# Patient Record
Sex: MALE | Race: WHITE | NOT HISPANIC OR LATINO | Employment: OTHER | URBAN - METROPOLITAN AREA
[De-identification: names, ages, dates, MRNs, and addresses within clinical notes are randomized per-mention and may not be internally consistent; named-entity substitution may affect disease eponyms.]

---

## 2018-01-29 DIAGNOSIS — Z01.810 PRE-OPERATIVE CARDIOVASCULAR EXAMINATION: ICD-10-CM

## 2018-01-29 DIAGNOSIS — Z01.812 PRE-OPERATIVE LABORATORY EXAMINATION: ICD-10-CM

## 2018-01-29 LAB
AMORPH CRY #/AREA URNS HPF: PRESENT /HPF
ANION GAP SERPL CALC-SCNC: 13 MMOL/L (ref 0–11.9)
APPEARANCE UR: ABNORMAL
BACTERIA #/AREA URNS HPF: ABNORMAL /HPF
BILIRUB UR QL STRIP.AUTO: NEGATIVE
BUN SERPL-MCNC: 12 MG/DL (ref 8–22)
CALCIUM SERPL-MCNC: 9.7 MG/DL (ref 8.5–10.5)
CHLORIDE SERPL-SCNC: 101 MMOL/L (ref 96–112)
CO2 SERPL-SCNC: 24 MMOL/L (ref 20–33)
COLOR UR: YELLOW
CREAT SERPL-MCNC: 0.98 MG/DL (ref 0.5–1.4)
CULTURE IF INDICATED INDCX: YES UA CULTURE
EKG IMPRESSION: NORMAL
EPI CELLS #/AREA URNS HPF: ABNORMAL /HPF
ERYTHROCYTE [DISTWIDTH] IN BLOOD BY AUTOMATED COUNT: 45.7 FL (ref 35.9–50)
GLUCOSE SERPL-MCNC: 94 MG/DL (ref 65–99)
GLUCOSE UR STRIP.AUTO-MCNC: NEGATIVE MG/DL
HCT VFR BLD AUTO: 48.5 % (ref 42–52)
HGB BLD-MCNC: 16.4 G/DL (ref 14–18)
KETONES UR STRIP.AUTO-MCNC: ABNORMAL MG/DL
LEUKOCYTE ESTERASE UR QL STRIP.AUTO: ABNORMAL
MCH RBC QN AUTO: 31.3 PG (ref 27–33)
MCHC RBC AUTO-ENTMCNC: 33.8 G/DL (ref 33.7–35.3)
MCV RBC AUTO: 92.6 FL (ref 81.4–97.8)
MICRO URNS: ABNORMAL
MUCOUS THREADS #/AREA URNS HPF: ABNORMAL /HPF
NITRITE UR QL STRIP.AUTO: POSITIVE
PH UR STRIP.AUTO: 6.5 [PH]
PLATELET # BLD AUTO: 317 K/UL (ref 164–446)
PMV BLD AUTO: 10.4 FL (ref 9–12.9)
POTASSIUM SERPL-SCNC: 3.4 MMOL/L (ref 3.6–5.5)
PROT UR QL STRIP: NEGATIVE MG/DL
RBC # BLD AUTO: 5.24 M/UL (ref 4.7–6.1)
RBC # URNS HPF: ABNORMAL /HPF
RBC UR QL AUTO: ABNORMAL
RENAL EPI CELLS #/AREA URNS HPF: ABNORMAL /HPF
SODIUM SERPL-SCNC: 138 MMOL/L (ref 135–145)
SP GR UR STRIP.AUTO: 1.01
UROBILINOGEN UR STRIP.AUTO-MCNC: 0.2 MG/DL
WBC # BLD AUTO: 8.8 K/UL (ref 4.8–10.8)
WBC #/AREA URNS HPF: ABNORMAL /HPF

## 2018-01-29 PROCEDURE — 36415 COLL VENOUS BLD VENIPUNCTURE: CPT

## 2018-01-29 PROCEDURE — 87186 SC STD MICRODIL/AGAR DIL: CPT

## 2018-01-29 PROCEDURE — 87077 CULTURE AEROBIC IDENTIFY: CPT

## 2018-01-29 PROCEDURE — 87641 MR-STAPH DNA AMP PROBE: CPT

## 2018-01-29 PROCEDURE — 85027 COMPLETE CBC AUTOMATED: CPT

## 2018-01-29 PROCEDURE — 81001 URINALYSIS AUTO W/SCOPE: CPT

## 2018-01-29 PROCEDURE — 80048 BASIC METABOLIC PNL TOTAL CA: CPT

## 2018-01-29 PROCEDURE — 93010 ELECTROCARDIOGRAM REPORT: CPT | Performed by: INTERNAL MEDICINE

## 2018-01-29 PROCEDURE — 87640 STAPH A DNA AMP PROBE: CPT

## 2018-01-29 PROCEDURE — 93005 ELECTROCARDIOGRAM TRACING: CPT

## 2018-01-29 PROCEDURE — 87086 URINE CULTURE/COLONY COUNT: CPT

## 2018-01-29 RX ORDER — POTASSIUM CHLORIDE 750 MG/1
10 TABLET, FILM COATED, EXTENDED RELEASE ORAL
COMMUNITY
End: 2020-04-16

## 2018-01-29 RX ORDER — HYDROCHLOROTHIAZIDE 12.5 MG/1
12.5 CAPSULE, GELATIN COATED ORAL DAILY
COMMUNITY

## 2018-01-29 RX ORDER — OXYCODONE AND ACETAMINOPHEN 10; 325 MG/1; MG/1
1-2 TABLET ORAL EVERY 4 HOURS PRN
COMMUNITY
End: 2020-04-16

## 2018-01-29 RX ORDER — LISINOPRIL 40 MG/1
40 TABLET ORAL DAILY
COMMUNITY

## 2018-01-29 RX ORDER — ZOLPIDEM TARTRATE 10 MG/1
10 TABLET ORAL NIGHTLY PRN
COMMUNITY
End: 2020-04-16

## 2018-01-29 NOTE — OR NURSING
"Pre-admit appointment completed. \"Preparing for your procedure\" sheet given to pt along with verbal and written instructions. Pt instructed to continue regularly prescribed medications through the day before surgery. Pt instructed to take the following medications the day of surgery with a sip of water, per anesthesia protocol; percocet if needed.    Pt has pain management with [facility]. States they are aware of surgical procedure.   "

## 2018-01-29 NOTE — DISCHARGE PLANNING
DISCHARGE PLANNING NOTE - TOTAL JOINT     Procedure: Procedure(s):  HIP ARTHROPLASTY TOTAL  Procedure Date: 2/12/2018  Insurance:  Payor: MEDICARE / Plan: MEDICARE PART A & B / Product Type: *No Product type* /   Equipment currently available at home? front-wheel walker and shower chair  Steps into the home? 1  Steps within the home? 0  Toilet height? Standard  Type of shower? walk-in shower  Who will be with you during your recovery? son  Is Outpatient Physical Therapy set up after surgery? No   Did you take the Total Joint Class and where? No     Plan: The patient declined the Guide to Joint replacement

## 2018-01-30 LAB
SCCMEC + MECA PNL NOSE NAA+PROBE: NEGATIVE
SCCMEC + MECA PNL NOSE NAA+PROBE: POSITIVE

## 2018-01-31 LAB
BACTERIA UR CULT: ABNORMAL
BACTERIA UR CULT: ABNORMAL
SIGNIFICANT IND 70042: ABNORMAL
SITE SITE: ABNORMAL
SOURCE SOURCE: ABNORMAL

## 2018-02-12 ENCOUNTER — APPOINTMENT (OUTPATIENT)
Dept: RADIOLOGY | Facility: MEDICAL CENTER | Age: 68
DRG: 470 | End: 2018-02-12
Attending: ORTHOPAEDIC SURGERY
Payer: MEDICARE

## 2018-02-12 ENCOUNTER — HOSPITAL ENCOUNTER (INPATIENT)
Facility: MEDICAL CENTER | Age: 68
LOS: 3 days | DRG: 470 | End: 2018-02-15
Attending: ORTHOPAEDIC SURGERY | Admitting: ORTHOPAEDIC SURGERY
Payer: MEDICARE

## 2018-02-12 DIAGNOSIS — M16.11 UNILATERAL PRIMARY OSTEOARTHRITIS, RIGHT HIP: Chronic | ICD-10-CM

## 2018-02-12 DIAGNOSIS — M17.12 PRIMARY OSTEOARTHRITIS OF LEFT KNEE: ICD-10-CM

## 2018-02-12 PROCEDURE — 501838 HCHG SUTURE GENERAL: Performed by: ORTHOPAEDIC SURGERY

## 2018-02-12 PROCEDURE — 700101 HCHG RX REV CODE 250

## 2018-02-12 PROCEDURE — 160048 HCHG OR STATISTICAL LEVEL 1-5: Performed by: ORTHOPAEDIC SURGERY

## 2018-02-12 PROCEDURE — 700112 HCHG RX REV CODE 229: Performed by: ORTHOPAEDIC SURGERY

## 2018-02-12 PROCEDURE — 0SR901A REPLACEMENT OF RIGHT HIP JOINT WITH METAL SYNTHETIC SUBSTITUTE, UNCEMENTED, OPEN APPROACH: ICD-10-PCS | Performed by: ORTHOPAEDIC SURGERY

## 2018-02-12 PROCEDURE — A9270 NON-COVERED ITEM OR SERVICE: HCPCS

## 2018-02-12 PROCEDURE — 160031 HCHG SURGERY MINUTES - 1ST 30 MINS LEVEL 5: Performed by: ORTHOPAEDIC SURGERY

## 2018-02-12 PROCEDURE — 700102 HCHG RX REV CODE 250 W/ 637 OVERRIDE(OP): Performed by: ORTHOPAEDIC SURGERY

## 2018-02-12 PROCEDURE — A9270 NON-COVERED ITEM OR SERVICE: HCPCS | Performed by: ORTHOPAEDIC SURGERY

## 2018-02-12 PROCEDURE — 700101 HCHG RX REV CODE 250: Performed by: ORTHOPAEDIC SURGERY

## 2018-02-12 PROCEDURE — 700111 HCHG RX REV CODE 636 W/ 250 OVERRIDE (IP)

## 2018-02-12 PROCEDURE — 700105 HCHG RX REV CODE 258: Performed by: ORTHOPAEDIC SURGERY

## 2018-02-12 PROCEDURE — 502578 HCHG PACK, TOTAL HIP: Performed by: ORTHOPAEDIC SURGERY

## 2018-02-12 PROCEDURE — 770001 HCHG ROOM/CARE - MED/SURG/GYN PRIV*

## 2018-02-12 PROCEDURE — 302135 SEQUENTIAL COMPRESSION MACHINE: Performed by: ORTHOPAEDIC SURGERY

## 2018-02-12 PROCEDURE — 160009 HCHG ANES TIME/MIN: Performed by: ORTHOPAEDIC SURGERY

## 2018-02-12 PROCEDURE — L3670 SO ACRO/CLAV CAN WEB PRE OTS: HCPCS | Performed by: ORTHOPAEDIC SURGERY

## 2018-02-12 PROCEDURE — A4450 NON-WATERPROOF TAPE: HCPCS | Performed by: ORTHOPAEDIC SURGERY

## 2018-02-12 PROCEDURE — 502000 HCHG MISC OR IMPLANTS RC 0278: Performed by: ORTHOPAEDIC SURGERY

## 2018-02-12 PROCEDURE — 700111 HCHG RX REV CODE 636 W/ 250 OVERRIDE (IP): Performed by: ORTHOPAEDIC SURGERY

## 2018-02-12 PROCEDURE — 160036 HCHG PACU - EA ADDL 30 MINS PHASE I: Performed by: ORTHOPAEDIC SURGERY

## 2018-02-12 PROCEDURE — 700102 HCHG RX REV CODE 250 W/ 637 OVERRIDE(OP)

## 2018-02-12 PROCEDURE — 160035 HCHG PACU - 1ST 60 MINS PHASE I: Performed by: ORTHOPAEDIC SURGERY

## 2018-02-12 PROCEDURE — A6223 GAUZE >16<=48 NO W/SAL W/O B: HCPCS | Performed by: ORTHOPAEDIC SURGERY

## 2018-02-12 PROCEDURE — 500864 HCHG NEEDLE, SPINAL 18G: Performed by: ORTHOPAEDIC SURGERY

## 2018-02-12 PROCEDURE — 160002 HCHG RECOVERY MINUTES (STAT): Performed by: ORTHOPAEDIC SURGERY

## 2018-02-12 PROCEDURE — 160042 HCHG SURGERY MINUTES - EA ADDL 1 MIN LEVEL 5: Performed by: ORTHOPAEDIC SURGERY

## 2018-02-12 PROCEDURE — 72170 X-RAY EXAM OF PELVIS: CPT

## 2018-02-12 DEVICE — IMPLANTABLE DEVICE: Type: IMPLANTABLE DEVICE | Site: HIP | Status: FUNCTIONAL

## 2018-02-12 RX ORDER — AMOXICILLIN 250 MG
1 CAPSULE ORAL NIGHTLY
Status: DISCONTINUED | OUTPATIENT
Start: 2018-02-12 | End: 2018-02-15 | Stop reason: HOSPADM

## 2018-02-12 RX ORDER — POTASSIUM CHLORIDE 20 MEQ/1
10 TABLET, EXTENDED RELEASE ORAL
Status: DISCONTINUED | OUTPATIENT
Start: 2018-02-13 | End: 2018-02-15 | Stop reason: HOSPADM

## 2018-02-12 RX ORDER — GABAPENTIN 300 MG/1
CAPSULE ORAL
Status: COMPLETED
Start: 2018-02-12 | End: 2018-02-12

## 2018-02-12 RX ORDER — ACETAMINOPHEN 500 MG
1000 TABLET ORAL EVERY 6 HOURS
Status: DISCONTINUED | OUTPATIENT
Start: 2018-02-12 | End: 2018-02-15 | Stop reason: HOSPADM

## 2018-02-12 RX ORDER — OXYCODONE HYDROCHLORIDE 5 MG/1
5 TABLET ORAL
Status: DISCONTINUED | OUTPATIENT
Start: 2018-02-12 | End: 2018-02-15 | Stop reason: HOSPADM

## 2018-02-12 RX ORDER — LISINOPRIL 20 MG/1
40 TABLET ORAL DAILY
Status: DISCONTINUED | OUTPATIENT
Start: 2018-02-12 | End: 2018-02-15 | Stop reason: HOSPADM

## 2018-02-12 RX ORDER — KETOROLAC TROMETHAMINE 30 MG/ML
INJECTION, SOLUTION INTRAMUSCULAR; INTRAVENOUS
Status: DISCONTINUED | OUTPATIENT
Start: 2018-02-12 | End: 2018-02-12 | Stop reason: HOSPADM

## 2018-02-12 RX ORDER — HYDROMORPHONE HYDROCHLORIDE 2 MG/ML
0.5 INJECTION, SOLUTION INTRAMUSCULAR; INTRAVENOUS; SUBCUTANEOUS
Status: DISCONTINUED | OUTPATIENT
Start: 2018-02-12 | End: 2018-02-15 | Stop reason: HOSPADM

## 2018-02-12 RX ORDER — ZOLPIDEM TARTRATE 5 MG/1
10 TABLET ORAL NIGHTLY PRN
Status: DISCONTINUED | OUTPATIENT
Start: 2018-02-12 | End: 2018-02-15 | Stop reason: HOSPADM

## 2018-02-12 RX ORDER — HALOPERIDOL 5 MG/ML
1 INJECTION INTRAMUSCULAR EVERY 6 HOURS PRN
Status: DISCONTINUED | OUTPATIENT
Start: 2018-02-12 | End: 2018-02-15 | Stop reason: HOSPADM

## 2018-02-12 RX ORDER — GABAPENTIN 300 MG/1
300 CAPSULE ORAL 3 TIMES DAILY
Status: DISCONTINUED | OUTPATIENT
Start: 2018-02-12 | End: 2018-02-15 | Stop reason: HOSPADM

## 2018-02-12 RX ORDER — POLYETHYLENE GLYCOL 3350 17 G/17G
1 POWDER, FOR SOLUTION ORAL 2 TIMES DAILY PRN
Status: DISCONTINUED | OUTPATIENT
Start: 2018-02-12 | End: 2018-02-15 | Stop reason: HOSPADM

## 2018-02-12 RX ORDER — DIPHENHYDRAMINE HYDROCHLORIDE 50 MG/ML
25 INJECTION INTRAMUSCULAR; INTRAVENOUS EVERY 6 HOURS PRN
Status: DISCONTINUED | OUTPATIENT
Start: 2018-02-12 | End: 2018-02-15 | Stop reason: HOSPADM

## 2018-02-12 RX ORDER — ROPIVACAINE HYDROCHLORIDE 5 MG/ML
INJECTION, SOLUTION EPIDURAL; INFILTRATION; PERINEURAL
Status: DISCONTINUED | OUTPATIENT
Start: 2018-02-12 | End: 2018-02-12 | Stop reason: HOSPADM

## 2018-02-12 RX ORDER — ONDANSETRON 2 MG/ML
4 INJECTION INTRAMUSCULAR; INTRAVENOUS EVERY 4 HOURS PRN
Status: DISCONTINUED | OUTPATIENT
Start: 2018-02-12 | End: 2018-02-15 | Stop reason: HOSPADM

## 2018-02-12 RX ORDER — SCOLOPAMINE TRANSDERMAL SYSTEM 1 MG/1
1 PATCH, EXTENDED RELEASE TRANSDERMAL
Status: DISCONTINUED | OUTPATIENT
Start: 2018-02-12 | End: 2018-02-15 | Stop reason: HOSPADM

## 2018-02-12 RX ORDER — BISACODYL 10 MG
10 SUPPOSITORY, RECTAL RECTAL
Status: DISCONTINUED | OUTPATIENT
Start: 2018-02-12 | End: 2018-02-15 | Stop reason: HOSPADM

## 2018-02-12 RX ORDER — ENEMA 19; 7 G/133ML; G/133ML
1 ENEMA RECTAL
Status: DISCONTINUED | OUTPATIENT
Start: 2018-02-12 | End: 2018-02-15 | Stop reason: HOSPADM

## 2018-02-12 RX ORDER — OXYCODONE HYDROCHLORIDE 10 MG/1
10 TABLET ORAL
Status: DISCONTINUED | OUTPATIENT
Start: 2018-02-12 | End: 2018-02-15 | Stop reason: HOSPADM

## 2018-02-12 RX ORDER — CELECOXIB 200 MG/1
200 CAPSULE ORAL 2 TIMES DAILY WITH MEALS
Status: DISCONTINUED | OUTPATIENT
Start: 2018-02-13 | End: 2018-02-15 | Stop reason: HOSPADM

## 2018-02-12 RX ORDER — EPINEPHRINE 1 MG/ML(1)
AMPUL (ML) INJECTION
Status: DISCONTINUED | OUTPATIENT
Start: 2018-02-12 | End: 2018-02-12 | Stop reason: HOSPADM

## 2018-02-12 RX ORDER — DOCUSATE SODIUM 100 MG/1
100 CAPSULE, LIQUID FILLED ORAL 2 TIMES DAILY
Status: DISCONTINUED | OUTPATIENT
Start: 2018-02-12 | End: 2018-02-15 | Stop reason: HOSPADM

## 2018-02-12 RX ORDER — HYDROCHLOROTHIAZIDE 12.5 MG/1
12.5 CAPSULE, GELATIN COATED ORAL DAILY
Status: DISCONTINUED | OUTPATIENT
Start: 2018-02-12 | End: 2018-02-15 | Stop reason: HOSPADM

## 2018-02-12 RX ORDER — AMOXICILLIN 250 MG
1 CAPSULE ORAL
Status: DISCONTINUED | OUTPATIENT
Start: 2018-02-12 | End: 2018-02-15 | Stop reason: HOSPADM

## 2018-02-12 RX ORDER — DEXTROSE, SODIUM CHLORIDE, SODIUM LACTATE, POTASSIUM CHLORIDE, AND CALCIUM CHLORIDE 5; .6; .31; .03; .02 G/100ML; G/100ML; G/100ML; G/100ML; G/100ML
INJECTION, SOLUTION INTRAVENOUS CONTINUOUS
Status: DISCONTINUED | OUTPATIENT
Start: 2018-02-12 | End: 2018-02-15 | Stop reason: HOSPADM

## 2018-02-12 RX ORDER — KETOROLAC TROMETHAMINE 30 MG/ML
15 INJECTION, SOLUTION INTRAMUSCULAR; INTRAVENOUS EVERY 6 HOURS
Status: COMPLETED | OUTPATIENT
Start: 2018-02-12 | End: 2018-02-13

## 2018-02-12 RX ORDER — OXYCODONE HCL 10 MG/1
TABLET, FILM COATED, EXTENDED RELEASE ORAL
Status: COMPLETED
Start: 2018-02-12 | End: 2018-02-12

## 2018-02-12 RX ORDER — CELECOXIB 200 MG/1
CAPSULE ORAL
Status: COMPLETED
Start: 2018-02-12 | End: 2018-02-12

## 2018-02-12 RX ORDER — SODIUM CHLORIDE, SODIUM LACTATE, POTASSIUM CHLORIDE, CALCIUM CHLORIDE 600; 310; 30; 20 MG/100ML; MG/100ML; MG/100ML; MG/100ML
1000 INJECTION, SOLUTION INTRAVENOUS
Status: DISCONTINUED | OUTPATIENT
Start: 2018-02-12 | End: 2018-02-15 | Stop reason: HOSPADM

## 2018-02-12 RX ORDER — DIPHENHYDRAMINE HCL 25 MG
25 TABLET ORAL EVERY 6 HOURS PRN
Status: DISCONTINUED | OUTPATIENT
Start: 2018-02-12 | End: 2018-02-15 | Stop reason: HOSPADM

## 2018-02-12 RX ORDER — DEXAMETHASONE SODIUM PHOSPHATE 4 MG/ML
4 INJECTION, SOLUTION INTRA-ARTICULAR; INTRALESIONAL; INTRAMUSCULAR; INTRAVENOUS; SOFT TISSUE
Status: DISCONTINUED | OUTPATIENT
Start: 2018-02-12 | End: 2018-02-15 | Stop reason: HOSPADM

## 2018-02-12 RX ADMIN — ACETAMINOPHEN 1000 MG: 500 TABLET ORAL at 23:05

## 2018-02-12 RX ADMIN — GABAPENTIN 300 MG: 300 CAPSULE ORAL at 16:18

## 2018-02-12 RX ADMIN — OXYCODONE HYDROCHLORIDE 10 MG: 10 TABLET ORAL at 16:19

## 2018-02-12 RX ADMIN — SODIUM CHLORIDE, SODIUM LACTATE, POTASSIUM CHLORIDE, CALCIUM CHLORIDE AND DEXTROSE MONOHYDRATE: 5; 600; 310; 30; 20 INJECTION, SOLUTION INTRAVENOUS at 23:23

## 2018-02-12 RX ADMIN — HYDROMORPHONE HYDROCHLORIDE 0.5 MG: 2 INJECTION INTRAMUSCULAR; INTRAVENOUS; SUBCUTANEOUS at 22:01

## 2018-02-12 RX ADMIN — SODIUM CHLORIDE, SODIUM LACTATE, POTASSIUM CHLORIDE, CALCIUM CHLORIDE 1000 ML: 600; 310; 30; 20 INJECTION, SOLUTION INTRAVENOUS at 11:00

## 2018-02-12 RX ADMIN — DOCUSATE SODIUM 100 MG: 100 CAPSULE, LIQUID FILLED ORAL at 20:08

## 2018-02-12 RX ADMIN — GABAPENTIN 300 MG: 300 CAPSULE ORAL at 20:07

## 2018-02-12 RX ADMIN — WATER 2 G: 100 INJECTION, SOLUTION INTRAVENOUS at 18:01

## 2018-02-12 RX ADMIN — OXYCODONE HYDROCHLORIDE 10 MG: 10 TABLET ORAL at 23:06

## 2018-02-12 RX ADMIN — SODIUM CHLORIDE, SODIUM LACTATE, POTASSIUM CHLORIDE, CALCIUM CHLORIDE AND DEXTROSE MONOHYDRATE 1000 ML: 5; 600; 310; 30; 20 INJECTION, SOLUTION INTRAVENOUS at 12:00

## 2018-02-12 RX ADMIN — OXYCODONE HYDROCHLORIDE 10 MG: 10 TABLET ORAL at 20:08

## 2018-02-12 RX ADMIN — DOCUSATE SODIUM AND SENNOSIDES 1 TABLET: 8.6; 5 TABLET, FILM COATED ORAL at 20:08

## 2018-02-12 RX ADMIN — ACETAMINOPHEN 1000 MG: 500 TABLET ORAL at 18:01

## 2018-02-12 RX ADMIN — GABAPENTIN 300 MG: 300 CAPSULE ORAL at 11:00

## 2018-02-12 RX ADMIN — ZOLPIDEM TARTRATE 10 MG: 5 TABLET ORAL at 23:13

## 2018-02-12 RX ADMIN — KETOROLAC TROMETHAMINE 15 MG: 30 INJECTION, SOLUTION INTRAMUSCULAR at 23:07

## 2018-02-12 RX ADMIN — LISINOPRIL 40 MG: 20 TABLET ORAL at 16:18

## 2018-02-12 RX ADMIN — KETOROLAC TROMETHAMINE 15 MG: 30 INJECTION, SOLUTION INTRAMUSCULAR at 18:01

## 2018-02-12 RX ADMIN — CELECOXIB 400 MG: 200 CAPSULE ORAL at 11:00

## 2018-02-12 RX ADMIN — OXYCODONE HYDROCHLORIDE 10 MG: 10 TABLET, FILM COATED, EXTENDED RELEASE ORAL at 11:00

## 2018-02-12 RX ADMIN — HYDROCHLOROTHIAZIDE 12.5 MG: 12.5 CAPSULE ORAL at 16:18

## 2018-02-12 ASSESSMENT — PATIENT HEALTH QUESTIONNAIRE - PHQ9
SUM OF ALL RESPONSES TO PHQ9 QUESTIONS 1 AND 2: 0
2. FEELING DOWN, DEPRESSED, IRRITABLE, OR HOPELESS: NOT AT ALL
1. LITTLE INTEREST OR PLEASURE IN DOING THINGS: NOT AT ALL
SUM OF ALL RESPONSES TO PHQ QUESTIONS 1-9: 0

## 2018-02-12 ASSESSMENT — PAIN SCALES - GENERAL
PAINLEVEL_OUTOF10: 2
PAINLEVEL_OUTOF10: 2
PAINLEVEL_OUTOF10: 10
PAINLEVEL_OUTOF10: 8
PAINLEVEL_OUTOF10: 8
PAINLEVEL_OUTOF10: 2
PAINLEVEL_OUTOF10: 8

## 2018-02-12 ASSESSMENT — LIFESTYLE VARIABLES
ALCOHOL_USE: NO
EVER_SMOKED: YES

## 2018-02-12 NOTE — OP REPORT
DATE OF SERVICE:  02/12/2018    PREOPERATIVE DIAGNOSIS:  Severe degenerative arthritis, right hip.    POSTOPERATIVE DIAGNOSIS:  Severe degenerative arthritis, right hip.    PROCEDURE:  Right total hip replacement using the Biomet system with an   ingrowth 64 G7 cup, a high wall liner, and ingrowth 18 Taperloc stem, and a   44+12 head.    SURGEON:  Kristopher Hanley MD    ASSISTANT:  Marbin Vera CST FA.  Of note, the assistant played a crucial role   in this procedure by helping with the exposure and instrumentation for this   complex arthroplasty.    ANESTHESIA:  General per endotracheal tube.    ANESTHESIOLOGIST:  Abner Castillo MD    OPERATIVE INDICATIONS:  The patient is a 67-year-old white male who has had a   long history of ongoing and worsening right hip pain.  He is no longer   responding to conservative program and this included activity modification,   nonsteroidal anti-inflammatories, corticosteroids, and exercise, and it was   felt that a hip replacement was indicated in this active gentleman.    PROCEDURE IN DETAIL:  The patient was brought to the operating room and placed   on table in supine position where a satisfactory general anesthetic agent was   administered per endotracheal tube.  The patient was given 2 grams of Ancef   and 10 mg/kg tranexamic acid IV prior to beginning the procedure.  The patient   was placed in right lateral decubitus position, secured on a PEG board.  The   left lower extremity was prepped with gel prep, draped free in a sterile   fashion.  An 18 cm posterolateral incision was made centered over the greater   trochanter.  This was carried sharply through skin and subcutaneous tissue   down to the fascia javi which was opened in line with the incision.  Charnley   retractor was placed.  The abductors were retracted.  The piriformis was   identified, tagged and divided.  The short external rotators were taken off as   a sleeve and a T-shaped capsulotomy was performed.  The  hip was dislocated.    A femoral neck osteotomy was made approximately 1.6 cm proximal to the lesser   trochanter.  Anterior capsule was released under direct visualization and an   anterior retractor was placed with a Hohmann inferiorly.  There were large   anterior and inferior osteophytes noted.  Spherical reamers were then used to   concentrically ream the acetabulum beginning with a size 54 and going up to a   size 63.  This gave excellent bleeding cortical cancellous bone.  A 64 trial   was placed with excellent fit.  There were noted to be 3 cysts within the   acetabulum, one actually quite large.  These were curetted out down to bone   with no further soft tissue remaining.  The wound was copiously irrigated with   normal saline using a waterpik and reamings from the spherical reamers were   packed into these defects and compressed with a 62 reamer on reverse.  The 64   G7 cup was placed approximately 45 degrees from the horizontal and 20 degrees   of anteversion and fully impacted with excellent stability.  This was secured   with 2 screws through the cup.  A high wall liner trial was placed with the   apex at the 10 o'clock position.  The proximal femur was delivered into the   wound.  The stump of the piriformis was resected.  The cookie cutter was used   followed by the canal finder, the tapered reamer and a lateralizing reamer.    The broaches for the Taperloc stems were then used beginning with a size 6 and   going up to a size 18.  The 18 gave excellent stability and fit.  Trial   reductions were performed with the 44+0, +6, +9, and +12 heads.  The +12 head   gave equal leg lengths with good stability.  There was some impingement   anteriorly over the osteophytes.  These osteophytes were then removed with an   osteotome and rongeur, which gave excellent stability throughout the range of   motion.  The trials were removed and the wound was copiously irrigated with   normal saline.  A joint solution was  created containing 40 mL of 0.5%   ropivacaine, 30 mg of Toradol, 1 mL of epinephrine diluted in 90 mL with   normal saline.  60 mL were placed in the periarticular tissues with the   remainder being placed in the subcutaneous tissue at the time of closure.  The   permanent high wall liner was then placed and fully impacted.  The proximal   femur was again delivered into the wound.  The proximal femur was irrigated   with normal saline.  The permanent 18 Taperloc stem was placed and fully   impacted again with excellent fit and stability.  Another trial reduction was   performed with the 44+12 head, which again gave equal leg lengths with   excellent stability and a full range of motion of the hip.  The 44+12 head was   then placed on to the cleaned Monzon taper and fully impacted.  The wound was   again copiously irrigated and a final reduction was performed.  The piriformis   was repaired with a #1 Ethibond.  The fascia javi was closed with #2 PDO   Quill suture, the subcutaneous tissue with 2-0 Monocryl, and the skin with   staples.  A sterile dressing of Adaptic flats and bandage tape was applied.    The patient was placed into a knee immobilizer, awakened, extubated in the   operating room, taken to recovery room in stable condition.  Sponge and needle   counts were correct.  There were no identified intraoperative complications.       ____________________________________     MD LC Chavarria / BRET    DD:  02/12/2018 13:49:42  DT:  02/12/2018 14:31:02    D#:  [id number]  Job#:  [id number]    cc: APRIL SON [facility] FA

## 2018-02-12 NOTE — OR SURGEON
----- Message from Cameron Yao CMA sent at 8/28/2017  4:21 PM EDT -----  Regarding: FW: Prescription Question  Contact: [phone]      ----- Message -----     From: Ed Spear     Sent: 8/28/2017  12:51 PM       To: Skip Bernal Teto Mr Clinical Pool  Subject: RE: Prescription Question                        I don't have any refills on the the Robaxin. I use [facility]  ----- Message -----  From: ASIM DRISCOLL  Sent: 8/25/2017 11:59 AM EDT  To: Ed Spear  Subject: RE: Prescription Question    Per Dr. Vermeesch, you can discontinue gabapentin and resume Robaxin 2-3 times a day as needed and be sure to take your Mobic with food.     ----- Message -----     From: Ed Spear     Sent: 8/25/2017 10:41 AM EDT       To: Marilyn K. Vermeesch, MD  Subject: Prescription Question    I was asking the doctor about the Gabapentin. I have had episodes of dizziness, faintness, and weakness since I've been taking this medication.  Do you want to take me off of this medicine.  This is what my daughter Farida Cano wants to know.  Thank you   Immediate Post OP Note    PreOp Diagnosis: Severe OA right hip    PostOp Diagnosis: same    Procedure(s):  HIP ARTHROPLASTY TOTAL - Wound Class: Clean    Surgeon(s):  Kristopher Hanley M.D.  Assist Chuy    Anesthesiologist/Type of Anesthesia:  Anesthesiologist: Abner Castillo M.D./General    Surgical Staff:  Assistant: RONY CopelandNMARLA  Circulator: Beatriz Amaro R.N.  Limb Rawls: Duane Ren  Scrub Person: Jason Guy    Specimens:  * No specimens in log *    Estimated Blood Loss: 150 cc    Findings: OA    Complications: none        2/12/2018 1:50 PM Kristopher Hanley

## 2018-02-13 LAB
ANION GAP SERPL CALC-SCNC: 7 MMOL/L (ref 0–11.9)
BUN SERPL-MCNC: 15 MG/DL (ref 8–22)
CALCIUM SERPL-MCNC: 8.6 MG/DL (ref 8.4–10.2)
CHLORIDE SERPL-SCNC: 101 MMOL/L (ref 96–112)
CO2 SERPL-SCNC: 27 MMOL/L (ref 20–33)
CREAT SERPL-MCNC: 0.88 MG/DL (ref 0.5–1.4)
ERYTHROCYTE [DISTWIDTH] IN BLOOD BY AUTOMATED COUNT: 51 FL (ref 35.9–50)
GLUCOSE SERPL-MCNC: 117 MG/DL (ref 65–99)
HCT VFR BLD AUTO: 37 % (ref 42–52)
HGB BLD-MCNC: 12.1 G/DL (ref 14–18)
MCH RBC QN AUTO: 31.1 PG (ref 27–33)
MCHC RBC AUTO-ENTMCNC: 32.7 G/DL (ref 33.7–35.3)
MCV RBC AUTO: 95.1 FL (ref 81.4–97.8)
PLATELET # BLD AUTO: 238 K/UL (ref 164–446)
PMV BLD AUTO: 9.7 FL (ref 9–12.9)
POTASSIUM SERPL-SCNC: 4.1 MMOL/L (ref 3.6–5.5)
RBC # BLD AUTO: 3.89 M/UL (ref 4.7–6.1)
SODIUM SERPL-SCNC: 135 MMOL/L (ref 135–145)
WBC # BLD AUTO: 8.2 K/UL (ref 4.8–10.8)

## 2018-02-13 PROCEDURE — 36415 COLL VENOUS BLD VENIPUNCTURE: CPT

## 2018-02-13 PROCEDURE — 97161 PT EVAL LOW COMPLEX 20 MIN: CPT

## 2018-02-13 PROCEDURE — A9270 NON-COVERED ITEM OR SERVICE: HCPCS | Performed by: ORTHOPAEDIC SURGERY

## 2018-02-13 PROCEDURE — 700101 HCHG RX REV CODE 250: Performed by: ORTHOPAEDIC SURGERY

## 2018-02-13 PROCEDURE — G8978 MOBILITY CURRENT STATUS: HCPCS | Mod: CK

## 2018-02-13 PROCEDURE — 97535 SELF CARE MNGMENT TRAINING: CPT

## 2018-02-13 PROCEDURE — 700111 HCHG RX REV CODE 636 W/ 250 OVERRIDE (IP): Performed by: ORTHOPAEDIC SURGERY

## 2018-02-13 PROCEDURE — 97530 THERAPEUTIC ACTIVITIES: CPT

## 2018-02-13 PROCEDURE — 770001 HCHG ROOM/CARE - MED/SURG/GYN PRIV*

## 2018-02-13 PROCEDURE — 700102 HCHG RX REV CODE 250 W/ 637 OVERRIDE(OP): Performed by: ORTHOPAEDIC SURGERY

## 2018-02-13 PROCEDURE — 700112 HCHG RX REV CODE 229: Performed by: ORTHOPAEDIC SURGERY

## 2018-02-13 PROCEDURE — G8979 MOBILITY GOAL STATUS: HCPCS | Mod: CJ

## 2018-02-13 PROCEDURE — 80048 BASIC METABOLIC PNL TOTAL CA: CPT

## 2018-02-13 PROCEDURE — 97116 GAIT TRAINING THERAPY: CPT

## 2018-02-13 PROCEDURE — 85027 COMPLETE CBC AUTOMATED: CPT

## 2018-02-13 RX ADMIN — ACETAMINOPHEN 1000 MG: 500 TABLET ORAL at 13:24

## 2018-02-13 RX ADMIN — GABAPENTIN 300 MG: 300 CAPSULE ORAL at 16:23

## 2018-02-13 RX ADMIN — HYDROCHLOROTHIAZIDE 12.5 MG: 12.5 CAPSULE ORAL at 10:19

## 2018-02-13 RX ADMIN — ACETAMINOPHEN 1000 MG: 500 TABLET ORAL at 20:34

## 2018-02-13 RX ADMIN — DOCUSATE SODIUM AND SENNOSIDES 1 TABLET: 8.6; 5 TABLET, FILM COATED ORAL at 20:34

## 2018-02-13 RX ADMIN — POTASSIUM CHLORIDE 10 MEQ: 1500 TABLET, EXTENDED RELEASE ORAL at 10:20

## 2018-02-13 RX ADMIN — ASPIRIN 325 MG: 325 TABLET, DELAYED RELEASE ORAL at 20:34

## 2018-02-13 RX ADMIN — WATER 2 G: 100 INJECTION, SOLUTION INTRAVENOUS at 02:18

## 2018-02-13 RX ADMIN — KETOROLAC TROMETHAMINE 15 MG: 30 INJECTION, SOLUTION INTRAMUSCULAR at 13:25

## 2018-02-13 RX ADMIN — DOCUSATE SODIUM 100 MG: 100 CAPSULE, LIQUID FILLED ORAL at 20:34

## 2018-02-13 RX ADMIN — ASPIRIN 325 MG: 325 TABLET, DELAYED RELEASE ORAL at 10:18

## 2018-02-13 RX ADMIN — GABAPENTIN 300 MG: 300 CAPSULE ORAL at 20:34

## 2018-02-13 RX ADMIN — OXYCODONE HYDROCHLORIDE 10 MG: 10 TABLET ORAL at 22:26

## 2018-02-13 RX ADMIN — ZOLPIDEM TARTRATE 10 MG: 5 TABLET ORAL at 23:20

## 2018-02-13 RX ADMIN — OXYCODONE HYDROCHLORIDE 10 MG: 10 TABLET ORAL at 16:23

## 2018-02-13 RX ADMIN — CELECOXIB 200 MG: 200 CAPSULE ORAL at 20:34

## 2018-02-13 RX ADMIN — KETOROLAC TROMETHAMINE 15 MG: 30 INJECTION, SOLUTION INTRAMUSCULAR at 06:23

## 2018-02-13 RX ADMIN — OXYCODONE HYDROCHLORIDE 10 MG: 10 TABLET ORAL at 13:24

## 2018-02-13 RX ADMIN — LISINOPRIL 40 MG: 20 TABLET ORAL at 10:20

## 2018-02-13 RX ADMIN — OXYCODONE HYDROCHLORIDE 10 MG: 10 TABLET ORAL at 10:20

## 2018-02-13 RX ADMIN — ACETAMINOPHEN 1000 MG: 500 TABLET ORAL at 06:22

## 2018-02-13 RX ADMIN — GABAPENTIN 300 MG: 300 CAPSULE ORAL at 10:19

## 2018-02-13 RX ADMIN — OXYCODONE HYDROCHLORIDE 10 MG: 10 TABLET ORAL at 02:11

## 2018-02-13 RX ADMIN — OXYCODONE HYDROCHLORIDE 10 MG: 10 TABLET ORAL at 06:23

## 2018-02-13 ASSESSMENT — PAIN SCALES - GENERAL
PAINLEVEL_OUTOF10: 7
PAINLEVEL_OUTOF10: 8
PAINLEVEL_OUTOF10: 7
PAINLEVEL_OUTOF10: 6
PAINLEVEL_OUTOF10: 7

## 2018-02-13 ASSESSMENT — GAIT ASSESSMENTS
ASSISTIVE DEVICE: FRONT WHEEL WALKER
DISTANCE (FEET): 100
ASSISTIVE DEVICE: FRONT WHEEL WALKER
GAIT LEVEL OF ASSIST: CONTACT GUARD ASSIST
DISTANCE (FEET): 125
DEVIATION: ANTALGIC;STEP TO
GAIT LEVEL OF ASSIST: CONTACT GUARD ASSIST
DEVIATION: ANTALGIC;STEP TO

## 2018-02-13 ASSESSMENT — ACTIVITIES OF DAILY LIVING (ADL): TOILETING: INDEPENDENT

## 2018-02-13 NOTE — THERAPY
Occupational Therapy- Eval initiated, will complete tomorrow when pt is scheduled for tentative d/c home.  Today extensive education provided re: hip precautions and adaptive equipment needed.  Pt will need reinforcement of this as he seemed to have limited recall of precautions despite taking pre op class and being seen by PT already 2x today.  Will follow.

## 2018-02-13 NOTE — CARE PLAN
Problem: Safety  Goal: Will remain free from injury  Outcome: PROGRESSING AS EXPECTED    Intervention: Provide assistance with mobility   02/13/18 0409   OTHER   Assistance / Tolerance Assistance of One;Tolerates Well         Problem: Venous Thromboembolism (VTW)/Deep Vein Thrombosis (DVT) Prevention:  Goal: Patient will participate in Venous Thrombosis (VTE)/Deep Vein Thrombosis (DVT)Prevention Measures  Outcome: PROGRESSING AS EXPECTED   02/12/18 2300   OTHER   Risk Assessment Score 4   VTE RISK High   Pharmacologic Prophylaxis Used (ASA)   Mechanical/VTE Prophylaxis   Mechanical Prophylaxis  SCDs, Sequential Compression Device   WILBER Hose (Graduated Compression Stockings) On   SCDs, Sequential Compression Device On

## 2018-02-13 NOTE — THERAPY
"Physical Therapy Evaluation completed.   Bed Mobility:  Supine to Sit: Contact Guard Assist  Transfers: Sit to Stand: Contact Guard Assist  Gait: Level Of Assist: Contact Guard Assist with Front-Wheel Walker x 100 feet      Plan of Care: Will benefit from Physical Therapy 7 times per week  Discharge Recommendations: Equipment: Raised Toilet Seat. Post-acute therapy Discharge to home with outpatient or home health for additional skilled therapy services.    See \"Rehab Therapy-Acute\" Patient Summary Report for complete documentation.     "

## 2018-02-14 PROBLEM — M16.11 UNILATERAL PRIMARY OSTEOARTHRITIS, RIGHT HIP: Chronic | Status: ACTIVE | Noted: 2018-02-14

## 2018-02-14 LAB — GLUCOSE BLD-MCNC: 110 MG/DL (ref 65–99)

## 2018-02-14 PROCEDURE — 770001 HCHG ROOM/CARE - MED/SURG/GYN PRIV*

## 2018-02-14 PROCEDURE — 700102 HCHG RX REV CODE 250 W/ 637 OVERRIDE(OP): Performed by: ORTHOPAEDIC SURGERY

## 2018-02-14 PROCEDURE — 97535 SELF CARE MNGMENT TRAINING: CPT

## 2018-02-14 PROCEDURE — 82962 GLUCOSE BLOOD TEST: CPT

## 2018-02-14 PROCEDURE — 700112 HCHG RX REV CODE 229: Performed by: ORTHOPAEDIC SURGERY

## 2018-02-14 PROCEDURE — G8988 SELF CARE GOAL STATUS: HCPCS | Mod: CJ

## 2018-02-14 PROCEDURE — A9270 NON-COVERED ITEM OR SERVICE: HCPCS | Performed by: ORTHOPAEDIC SURGERY

## 2018-02-14 PROCEDURE — 97165 OT EVAL LOW COMPLEX 30 MIN: CPT

## 2018-02-14 PROCEDURE — 97116 GAIT TRAINING THERAPY: CPT

## 2018-02-14 PROCEDURE — 97110 THERAPEUTIC EXERCISES: CPT

## 2018-02-14 PROCEDURE — 97530 THERAPEUTIC ACTIVITIES: CPT

## 2018-02-14 PROCEDURE — G8987 SELF CARE CURRENT STATUS: HCPCS | Mod: CK

## 2018-02-14 RX ORDER — ALPRAZOLAM 0.5 MG/1
0.5 TABLET ORAL ONCE
Status: COMPLETED | OUTPATIENT
Start: 2018-02-14 | End: 2018-02-14

## 2018-02-14 RX ORDER — GABAPENTIN 300 MG/1
300 CAPSULE ORAL 3 TIMES DAILY
Qty: 90 CAP | Refills: 0 | Status: SHIPPED | OUTPATIENT
Start: 2018-02-14

## 2018-02-14 RX ADMIN — OXYCODONE HYDROCHLORIDE 10 MG: 10 TABLET ORAL at 01:47

## 2018-02-14 RX ADMIN — CELECOXIB 200 MG: 200 CAPSULE ORAL at 09:59

## 2018-02-14 RX ADMIN — ACETAMINOPHEN 1000 MG: 500 TABLET ORAL at 13:43

## 2018-02-14 RX ADMIN — GABAPENTIN 300 MG: 300 CAPSULE ORAL at 09:57

## 2018-02-14 RX ADMIN — DOCUSATE SODIUM 100 MG: 100 CAPSULE, LIQUID FILLED ORAL at 09:00

## 2018-02-14 RX ADMIN — ZOLPIDEM TARTRATE 10 MG: 5 TABLET ORAL at 20:25

## 2018-02-14 RX ADMIN — GABAPENTIN 300 MG: 300 CAPSULE ORAL at 17:21

## 2018-02-14 RX ADMIN — ALPRAZOLAM 0.5 MG: 0.5 TABLET ORAL at 04:06

## 2018-02-14 RX ADMIN — OXYCODONE HYDROCHLORIDE 10 MG: 10 TABLET ORAL at 20:25

## 2018-02-14 RX ADMIN — ASPIRIN 325 MG: 325 TABLET, DELAYED RELEASE ORAL at 09:58

## 2018-02-14 RX ADMIN — CELECOXIB 200 MG: 200 CAPSULE ORAL at 17:22

## 2018-02-14 RX ADMIN — ACETAMINOPHEN 1000 MG: 500 TABLET ORAL at 07:13

## 2018-02-14 RX ADMIN — OXYCODONE HYDROCHLORIDE 10 MG: 10 TABLET ORAL at 13:43

## 2018-02-14 RX ADMIN — GABAPENTIN 300 MG: 300 CAPSULE ORAL at 20:24

## 2018-02-14 RX ADMIN — LISINOPRIL 40 MG: 20 TABLET ORAL at 09:57

## 2018-02-14 RX ADMIN — ASPIRIN 325 MG: 325 TABLET, DELAYED RELEASE ORAL at 20:24

## 2018-02-14 RX ADMIN — ACETAMINOPHEN 1000 MG: 500 TABLET ORAL at 23:25

## 2018-02-14 RX ADMIN — OXYCODONE HYDROCHLORIDE 10 MG: 10 TABLET ORAL at 07:13

## 2018-02-14 RX ADMIN — OXYCODONE HYDROCHLORIDE 10 MG: 10 TABLET ORAL at 17:21

## 2018-02-14 RX ADMIN — HYDROCHLOROTHIAZIDE 12.5 MG: 12.5 CAPSULE ORAL at 09:58

## 2018-02-14 RX ADMIN — ACETAMINOPHEN 1000 MG: 500 TABLET ORAL at 01:47

## 2018-02-14 RX ADMIN — OXYCODONE HYDROCHLORIDE 10 MG: 10 TABLET ORAL at 23:25

## 2018-02-14 ASSESSMENT — PAIN SCALES - GENERAL
PAINLEVEL_OUTOF10: 6
PAINLEVEL_OUTOF10: 7
PAINLEVEL_OUTOF10: 7
PAINLEVEL_OUTOF10: 6
PAINLEVEL_OUTOF10: 3
PAINLEVEL_OUTOF10: 4
PAINLEVEL_OUTOF10: 6

## 2018-02-14 ASSESSMENT — GAIT ASSESSMENTS
DEVIATION: ANTALGIC;DECREASED HEEL STRIKE;DECREASED TOE OFF
GAIT LEVEL OF ASSIST: CONTACT GUARD ASSIST
ASSISTIVE DEVICE: FRONT WHEEL WALKER
DISTANCE (FEET): 100

## 2018-02-14 ASSESSMENT — ACTIVITIES OF DAILY LIVING (ADL): TOILETING: INDEPENDENT

## 2018-02-14 NOTE — DISCHARGE INSTRUCTIONS
Wear immobilizer at night when sleeping. May shower with incision covered. Aspirin 325 mg twice daily.    Discharge Instructions    Discharged to home by car with relative. Discharged via wheelchair, hospital escort: Yes.  Special equipment needed: Walker    Be sure to schedule a follow-up appointment with your primary care doctor or any specialists as instructed.     Discharge Plan:   Influenza Vaccine Indication: Not indicated: Previously immunized this influenza season and > 8 years of age    I understand that a diet low in cholesterol, fat, and sodium is recommended for good health. Unless I have been given specific instructions below for another diet, I accept this instruction as my diet prescription.   Other diet: n/a    Special Instructions: Discharge instructions for the Orthopedic Patient    Follow up with Primary Care Physician within 2 weeks of discharge to home, regarding:  Review of medications and diagnostic testing.  Surveillance for medical complications.  Workup and treatment of osteoporosis, if appropriate.     -Is this a Joint Replacement patient? Yes   Total Joint Hip Replacement Discharge Instructions    Pain  - The goal is to slowly wean off the prescription pain medicine.  - Ice can be used for pain control.  20 minutes at a time is recommended, and never directly against your skin or incision.  - Most patients are off the pain pills by 3 weeks; others may require a low level of pain medications for many months. If your pain continues to be severe, follow up with your physician.  Infection  Deep hip joint infections that require removal of the prostheses occur in less than 0.1% of patients. Lesser infections in the skin (cellulites) are more common and much more easily treated.  - Keep the incision as clean and dry as possible.  - Always wash your hands before touching your incision.  - Skin infections tend to develop around 7-10 days after surgery, most can be treated with oral  antibiotics.  - Dental Care should be delayed for 3 months after surgery, your surgeon recommends taking a dose of antibiotics 1 hour prior to any dental procedure.  After 2 years, most surgeons recommend antibiotics only before an extensive procedure.  Ask your surgeon what he recommends.  - Signs and symptoms of infection can include:  low grade fever, redness, pain, swelling and drainage from your incision.  Notify your surgeon immediately if you develop any of these symptoms.  Post op Disturbances  - Bowel habits - constipation is extremely common and is caused by a combination of anesthesia, lack of mobility and pain medicine.  Use stool softeners or laxatives if necessary. It is important not to ignore this problem, as bowel obstructions can be a serious complication after joint replacement surgery.  - Mood/Energy Level - Many patients experience a lack of energy and endurance for up to 2-3 months after surgery.  Some may also feel down and can even become depressed.  This is likely due to the postoperative anemia, change in activity level, lack of sleep, pain medicine and just the emotional reaction to the surgery itself that is a big disruption in a person’s life.  This usually passes.  If symptoms persist, follow up with your primary physician.  - Returning to work - Your surgeon will give you more specific instructions.  Generally, if you work a sedentary job requiring little standing or walking, most patients may return within 2-6 weeks.  Manual labor jobs involving walking, lifting and standing may take 3-4 months.  Your surgeon’s office can provide a release to part-time or light duty work early on in your recovery and progress you to full duty as able.  - Driving - You can begin driving an automatic shift car in 4 to 8 weeks, provided you are no longer taking narcotic pain medication. If you have a stick-shift car and your right hip was replaced, do not begin driving until your doctor says you can.    - Avoiding falls -  throw rugs and tack down loose carpeting.  Be aware of floor hazards such as pets, small objects or uneven surfaces.   -  Airport Metal Detectors - The sensitivity of metal detectors varies and it is likely that your prosthesis will cause an alarm. Inform the  that you have an artificial joint.  Diet  - Resume your normal diet as tolerated.  - It is important to achieve a healthy nutritional status by eating a well balanced diet on a regular basis.  - Your physician may recommend that you take iron and vitamin supplements.   - Continue to drink plenty of fluids.  Shower/Bathing  - You may shower as soon as you get home from the hospital unless otherwise instructed.  - Keep your incision out of water.  To keep the incision dry when showering, cover it with a plastic bag or plastic wrap.  - Pat incision dry if it gets wet.  Don’t rub.  - Do not submerge in a bath until staples are out and the incision is completely healed. (Approximately 6-8 weeks after surgery).  Dressing Change:  Procedure (if recommended by your physician)  - Wash hands.  - Open all dressing change materials.  - Remove old dressing and discard.  - Inspect incision for redness, increase in clear drainage, yellow/green drainage, odor and surrounding skin hot to touch.  -  ABD (large gauze) pad by one corner and lay over the incision.  Be careful not to touch the inside of the dressing that will lay over the incision.  - Secure in place as instructed (Ace wrap or tape).    Swelling/Bruising  - Swelling is normal after hip replacement and can involve the thigh, knee, calf and foot.  - Swelling can last from 3-6 months.  - Elevate your leg higher than your heart while reclining.  The first week you are home you should elevate your leg an equal amount of time, as you are active.    - Anti-inflammatory pills can be taken once you have stopped the blood thinners.  - The swelling is usually worse after you  go home since you are upright for longer periods of time.  - Bruising is common and can involve the entire leg including the thigh, calf and even foot.  Bruising often does not appear until after you arrive home and it can be quite dramatic- purple, black, green.  The bruising you can see is not usually concerning and will subside without any treatment.      Blood Clot Prevention  Blood clots in the legs and the less common, but frightening, clots that travel to the lungs are a real focus of our preventative. Most patients are at standard risk for them, but those patients who are at higher risk include people who have had previous clots, a family history of clotting, smoking, diabetes, obesity, advanced age, use of estrogen and a sedentary lifestyle.    - Signs of blood clots in legs - Swelling in thigh, calf or ankle that does not go down with elevation.  Pain, heat and tenderness in calf, back of calf or groin area.  NOTE: blood clots can occur in either leg.  - You have been receiving anticoagulant therapy (blood thinners) in the hospital and you may be instructed to continue at home depending on your risk factors.  - Your risk for developing a clot continues for up to 2-3 months after surgery.  You should avoid prolonged sitting and dehydration during that time (long air trips and car trips).  If you do take a trip during this time, please get up and move around every 1- 1.5 hours.  - If you are prescribed blood thinning medication for home, follow instructions as directed. (Handouts provided if applicable).      Activity    Once you get home, you should stay active. The key is not to overdo it! While you can expect some good days and some bad days, you should notice a gradual improvement over time you should notice a gradual improvement and a gradual increase in your endurance over the next 6 to 12 months.    - Weight Bearing - If you have undergone cemented or hybrid hip replacement, you can put some weight on  the leg immediately using a cane or walker, and you should continue to use some support for 4 to 6 weeks to help the muscles recover.   - Sleeping Positions - Sleep on your back with your legs slightly apart or on your side with a regular pillow between your knees. Be sure to use the pillow for at least 6 weeks, or until your doctor says you can do without it. Sleeping on your stomach should be all right  - Sitting - For at least the first 3 months, sit only in chairs that have arms. Do not sit on low chairs, low stools, or reclining chairs. Do not cross your legs at the knees. The physical therapist will show you how to sit and stand from a chair, keeping your affected leg out in front of you. Get up and move around on a regular basis--at least once every hour.  - Walking - Walk as much as you like once your doctor gives you the go-ahead, but remember that walking is no substitute for your prescribed exercises. Walking with a pair of trekking poles is helpful and adds as much as 40% to the exercise you get when you walk  - Therapy may be needed in some cases, to strengthen your muscles and improve your gait (walking pattern).  This decision will be made at your post-operative appointment.  Follow your therapist recommended post-operative exercises (handout provided by Therapist).  - Swimming is also recommended; you can begin as soon as the sutures have been removed and the wound is healed, approximately 6 to 8 weeks after surgery. Using a pair of training fins may make swimming a more enjoyable and effective exercise.  - Other activities - Lower impact activities are preferred.  If you have specific questions, consult your Surgeon.    - Sexual activity - Your surgeon can tell you when it should be safe to resume sexual activity.      When to Call the Doctor   Call the physician if:   - Fever over 100.5? F  - Increased pain, drainage, redness, odor or heat around the incision area  - Shaking chills  - Increased  knee pain with activity and rest  - Increased pain in calf, tenderness or redness above or below the knee  - Increased swelling of calf, ankle, foot  - Sudden increased shortness of breath, sudden onset of chest pain, localized chest pain with coughing  - Incision opening  Or, if there are any questions or concerns about medications or care.       -Is this patient being discharged with medication to prevent blood clots?  Yes, Aspirin Aspirin, ASA oral tablets  What is this medicine?  ASPIRIN (AS pir in) is a pain reliever. It is used to treat mild pain and fever. This medicine is also used as directed by a doctor to prevent and to treat heart attacks, to prevent strokes, and to treat arthritis or inflammation.  This medicine may be used for other purposes; ask your health care provider or pharmacist if you have questions.  COMMON BRAND NAME(S): Aspir-Low, Aspir-Yasmin , Aspirtab , Omnic[employer]y Advanced Aspirin, Omnic[employer]y Aspirin Extra Strength, Omnicademy Aspirin Plus, Omnic[employer]y Aspirin, Omnicademy Genuine Aspirin, Omnicademy Womens Aspirin , Bufferin Extra Strength, Bufferin Low Dose, Bufferin  What should I tell my health care provider before I take this medicine?  They need to know if you have any of these conditions:  -anemia  -asthma  -bleeding problems  -child with chickenpox, the flu, or other viral infection  -diabetes  -gout  -if you frequently drink alcohol containing drinks  -kidney disease  -liver disease  -low level of vitamin K  -lupus  -smoke tobacco  -stomach ulcers or other problems  -an unusual or allergic reaction to aspirin, tartrazine dye, other medicines, dyes, or preservatives  -pregnant or trying to get pregnant  -breast-feeding  How should I use this medicine?  Take this medicine by mouth with a glass of water. Follow the directions on the package or prescription label. You can take this medicine with or without food. If it upsets your stomach, take it with food. Do not take your medicine more often than directed.  Talk to  your pediatrician regarding the use of this medicine in children. While this drug may be prescribed for children as young as 12 years of age for selected conditions, precautions do apply. Children and teenagers should not use this medicine to treat chicken pox or flu symptoms unless directed by a doctor.  Patients over 65 years old may have a stronger reaction and need a smaller dose.  Overdosage: If you think you have taken too much of this medicine contact a poison control center or emergency room at once.  NOTE: This medicine is only for you. Do not share this medicine with others.  What if I miss a dose?  If you are taking this medicine on a regular schedule and miss a dose, take it as soon as you can. If it is almost time for your next dose, take only that dose. Do not take double or extra doses.  What may interact with this medicine?  Do not take this medicine with any of the following medications:  -cidofovir  -ketorolac  -probenecid  This medicine may also interact with the following medications:  -alcohol  -alendronate  -bismuth subsalicylate  -flavocoxid  -herbal supplements like feverfew, garlic, sara, ginkgo biloba, horse chestnut  -medicines for diabetes or glaucoma like acetazolamide, methazolamide  -medicines for gout  -medicines that treat or prevent blood clots like enoxaparin, heparin, ticlopidine, warfarin  -other aspirin and aspirin-like medicines  -NSAIDs, medicines for pain and inflammation, like ibuprofen or naproxen  -pemetrexed  -sulfinpyrazone  -varicella live vaccine  This list may not describe all possible interactions. Give your health care provider a list of all the medicines, herbs, non-prescription drugs, or dietary supplements you use. Also tell them if you smoke, drink alcohol, or use illegal drugs. Some items may interact with your medicine.  What should I watch for while using this medicine?  If you are treating yourself for pain, tell your doctor or health care professional if  the pain lasts more than 10 days, if it gets worse, or if there is a new or different kind of pain. Tell your doctor if you see redness or swelling. Also, check with your doctor if you have a fever that lasts for more than 3 days. Only take this medicine to prevent heart attacks or blood clotting if prescribed by your doctor or health care professional.  Do not take aspirin or aspirin-like medicines with this medicine. Too much aspirin can be dangerous. Always read the labels carefully.  This medicine can irritate your stomach or cause bleeding problems. Do not smoke cigarettes or drink alcohol while taking this medicine. Do not lie down for 30 minutes after taking this medicine to prevent irritation to your throat.  If you are scheduled for any medical or dental procedure, tell your healthcare provider that you are taking this medicine. You may need to stop taking this medicine before the procedure.  What side effects may I notice from receiving this medicine?  Side effects that you should report to your doctor or health care professional as soon as possible:  -allergic reactions like skin rash, itching or hives, swelling of the face, lips, or tongue  -black, tarry stools  -bloody, coffee ground-like vomit  -breathing problems  -changes in hearing, ringing in the ears  -confusion  -general ill feeling or flu-like symptoms  -pain on swallowing  -redness, blistering, peeling or loosening of the skin, including inside the mouth or nose  -trouble passing urine or change in the amount of urine  -unusual bleeding or bruising  -unusually weak or tired  -yellowing of the eyes or skin  Side effects that usually do not require medical attention (report to your doctor or health care professional if they continue or are bothersome):  -diarrhea or constipation  -nausea, vomiting  -stomach gas, heartburn  This list may not describe all possible side effects. Call your doctor for medical advice about side effects. You may report  side effects to FDA at 7-956-FDA-8894.  Where should I keep my medicine?  Keep out of the reach of children.  Store at room temperature between 15 and 30 degrees C (59 and 86 degrees F). Protect from heat and moisture. Do not use this medicine if it has a strong vinegar smell. Throw away any unused medicine after the expiration date.  NOTE: This sheet is a summary. It may not cover all possible information. If you have questions about this medicine, talk to your doctor, pharmacist, or health care provider.  © 2014, [employer]/Gold Standard. (3/10/2009 10:44:17 AM)      · Is patient discharged on Warfarin / Coumadin?   No     Depression / Suicide Risk    As you are discharged from this [facility] facility, it is important to learn how to keep safe from harming yourself.    Recognize the warning signs:  · Abrupt changes in personality, positive or negative- including increase in energy   · Giving away possessions  · Change in eating patterns- significant weight changes-  positive or negative  · Change in sleeping patterns- unable to sleep or sleeping all the time   · Unwillingness or inability to communicate  · Depression  · Unusual sadness, discouragement and loneliness  · Talk of wanting to die  · Neglect of personal appearance   · Rebelliousness- reckless behavior  · Withdrawal from people/activities they love  · Confusion- inability to concentrate     If you or a loved one observes any of these behaviors or has concerns about self-harm, here's what you can do:  · Talk about it- your feelings and reasons for harming yourself  · Remove any means that you might use to hurt yourself (examples: pills, rope, extension cords, firearm)  · Get professional help from the community (Mental Health, Substance Abuse, psychological counseling)  · Do not be alone:Call your Safe Contact- someone whom you trust who will be there for you.  · Call your local CRISIS HOTLINE 792-7625 or 791-383-7706  · Call your local Children's  Mobile Crisis Response Team [place] (246) 976-1867 or www.[employer]  · Call the toll free National Suicide Prevention Hotlines   · National Suicide Prevention Lifeline 923-382-LYVL (6587)  · National Hope Line Network 800-SUICIDE (090-6050)

## 2018-02-14 NOTE — THERAPY
"Physical Therapy Treatment completed.   Bed Mobility:  Supine to Sit: Moderate Assist  Transfers: Sit to Stand: Contact Guard Assist  Gait: Level Of Assist: Contact Guard Assist with Front-Wheel Walker       Plan of Care: Will benefit from Physical Therapy 7 times per week  Discharge Recommendations: Equipment: Raised Toilet Seat. Post-acute therapy Discharge to a transitional care facility for continued skilled therapy services with focus on strengthening and gait/ balance training to ensure DC home is safe (pt lives alone in [facility] room and has a son who can stay with him for a short while).    See \"Rehab Therapy-Acute\" Patient Summary Report for complete documentation.       "

## 2018-02-14 NOTE — DISCHARGE SUMMARY
DATE OF ADMISSION:  02/12/2018    DATE OF DISCHARGE:  02/14/2018    ADMITTING DIAGNOSIS:  Severe degenerative arthritis, right hip.    DISCHARGE DIAGNOSIS:  Severe degenerative arthritis, right hip.    PROCEDURE:  Right total hip replacement on 02/12/2018.    INFECTIONS:  None.    COMPLICATIONS:  None.    CONDITION ON DISCHARGE:  Stable.    ADMITTING INFORMATION:  Please see dictated note.    HOSPITAL COURSE:  After appropriate preoperative evaluation, obtaining   informed consent, patient was taken to the operating on 02/12/2018 where the   above-listed procedure was performed under general anesthetic agent.  The   patient tolerated the procedure well and was taken to recovery room and then   to the floor in stable condition.  He received 24 hours of perioperative Ancef   and was placed on aspirin as well as use of mechanical prophylaxis for DVT   prevention.  He was begun on ambulation on the afternoon of surgery and had   moderate discomfort on postop day #1 and was felt to be a little unsteady by   postop day #2, was comfortable on p.o. pain medication and independently   ambulatory with his walker.  He has stable labs, clean and dry wound, intact   neurologic and vascular exam and soft and nontender calf.  He will be   discharged home after p.m. therapy on postop day #2.    DISPOSITION:  The patient will be discharged home today and will return to see   me in 2 weeks for a wound check and staple removal.  He may be weightbearing   as tolerated on the right leg.  He has been repeatedly instructed in total hip   precautions and will wear his knee immobilizer at night when sleeping.    Arrangements have been made for outpatient physical therapy to begin this   week.  He does have a walker at home and an order has been placed for a 3-in-1   commode to function as an elevated toilet seat.  He may shower with incision   covered, but the wound should be kept clean and dry and we should be notified   if there are  problems with increasing pain, drainage, evidence of infection,   neurovascular compromise or other significant concern.  He will resume his   normal preoperative home medications and has his pain medicine at home from   Dr. Bloom.  He was given some gabapentin by his primary care doctor as a   trial and this helped so.  I have also written him a prescription for   gabapentin 300 mg, #90 to take 1 p.o. t.i.d. for 1 month.  He will take an   aspirin 325 mg p.o. b.i.d. for DVT prophylaxis.       ____________________________________     MD LC Chavarria / BRET    DD:  02/14/2018 08:49:14  DT:  02/14/2018 11:48:01    D#:  [id number]  Job#:  [id number]

## 2018-02-14 NOTE — THERAPY
"Occupational Therapy Evaluation completed.   Functional Status:  Chacorta supine to sit EOB.  Chacorta LB dressing using reacher, sockaide and LH shoehorn.  Pt needs constant cues to remember not to bend forward past 90 degrees.  Pt was educated yesterday on equipment and precautions but He seems to have poor carryover of his hip precautions.  CGA sit to stand with height of bed slightly elevated.  Close CGA with FWW to walk into bathroom and CGA to stand to urinate.  Back into bed with Chacorta with LE's.  Pt educated on where to get hip kit, OT suggested [facility] or [employer] or [employer], however pt proceeded to read the entire equipment handout out loud to therapist, getting self quite distracted.  Needs frequent redirection and cues for safety.  At this time, pt is NOT safe to d/c home and would benefit from further inpt post acute therapy prior to home.  Will follow.  Plan of Care: Will benefit from Occupational Therapy 3 times per week  Discharge Recommendations:  Equipment: hip kit, shower chair, raised toilet seat vs 3 in 1 commode. Post-acute therapy Discharge to a transitional care facility for continued skilled therapy services.    See \"Rehab Therapy-Acute\" Patient Summary Report for complete documentation.    "

## 2018-02-14 NOTE — CARE PLAN
Problem: Safety  Goal: Will remain free from injury  Outcome: PROGRESSING AS EXPECTED  Bed locked and low, controls on call light button within reach.   Pt uses call light button appropriately and calls for needs/help, frequently    Problem: Infection  Goal: Will remain free from infection  Outcome: PROGRESSING AS EXPECTED  Remains afebrile, has been sweating a lot - MD made aware.   Will continue to monitor.    Problem: Bowel/Gastric:  Goal: Normal bowel function is maintained or improved  Abdomen is big and rounded, semi-firm.  Passing gas, bowel sounds present x4 quads.  Increased appetite.    Problem: Pain Management  Goal: Pain level will decrease to patient's comfort goal  Outcome: PROGRESSING AS EXPECTED   02/13/18 1426 02/14/18 0200   OTHER   Nurse Pain Scale 0 - 10  --  6  (after bed sheet change)   Non Verbal Scale  --  Calm;Unlabored Breathing   Therapist Pain Assessment 6;Prior to Activity;Nurse Notified --    Comfort Goal --  Comfort at Rest;Comfort with Movement       Problem: Mobility  Goal: Risk for activity intolerance will decrease  Outcome: PROGRESSING SLOWER THAN EXPECTED  Leg Immobilizer in place while in bed

## 2018-02-15 VITALS
TEMPERATURE: 97.9 F | OXYGEN SATURATION: 99 % | HEART RATE: 92 BPM | BODY MASS INDEX: 32.89 KG/M2 | HEIGHT: 76 IN | DIASTOLIC BLOOD PRESSURE: 67 MMHG | SYSTOLIC BLOOD PRESSURE: 119 MMHG | RESPIRATION RATE: 18 BRPM | WEIGHT: 270.06 LBS

## 2018-02-15 PROCEDURE — 97116 GAIT TRAINING THERAPY: CPT

## 2018-02-15 PROCEDURE — A9270 NON-COVERED ITEM OR SERVICE: HCPCS | Performed by: ORTHOPAEDIC SURGERY

## 2018-02-15 PROCEDURE — 97110 THERAPEUTIC EXERCISES: CPT

## 2018-02-15 PROCEDURE — 700102 HCHG RX REV CODE 250 W/ 637 OVERRIDE(OP): Performed by: ORTHOPAEDIC SURGERY

## 2018-02-15 RX ADMIN — HYDROCHLOROTHIAZIDE 12.5 MG: 12.5 CAPSULE ORAL at 08:33

## 2018-02-15 RX ADMIN — LISINOPRIL 40 MG: 20 TABLET ORAL at 08:33

## 2018-02-15 RX ADMIN — OXYCODONE HYDROCHLORIDE 10 MG: 10 TABLET ORAL at 06:54

## 2018-02-15 RX ADMIN — ACETAMINOPHEN 1000 MG: 500 TABLET ORAL at 05:41

## 2018-02-15 RX ADMIN — ACETAMINOPHEN 1000 MG: 500 TABLET ORAL at 12:38

## 2018-02-15 RX ADMIN — ASPIRIN 325 MG: 325 TABLET, DELAYED RELEASE ORAL at 08:32

## 2018-02-15 RX ADMIN — GABAPENTIN 300 MG: 300 CAPSULE ORAL at 08:33

## 2018-02-15 RX ADMIN — OXYCODONE HYDROCHLORIDE 10 MG: 10 TABLET ORAL at 12:39

## 2018-02-15 RX ADMIN — CELECOXIB 200 MG: 200 CAPSULE ORAL at 08:33

## 2018-02-15 RX ADMIN — OXYCODONE HYDROCHLORIDE 10 MG: 10 TABLET ORAL at 03:53

## 2018-02-15 ASSESSMENT — GAIT ASSESSMENTS
DEVIATION: STEP TO;ANTALGIC
ASSISTIVE DEVICE: FRONT WHEEL WALKER
DISTANCE (FEET): 100
GAIT LEVEL OF ASSIST: STAND BY ASSIST

## 2018-02-15 ASSESSMENT — PAIN SCALES - GENERAL
PAINLEVEL_OUTOF10: 6
PAINLEVEL_OUTOF10: 4
PAINLEVEL_OUTOF10: 8
PAINLEVEL_OUTOF10: 6

## 2018-02-15 NOTE — THERAPY
"Physical Therapy Treatment completed.   Bed Mobility:  Supine to Sit: Minimal Assist  Transfers: Sit to Stand: Stand by Assist  Gait: Level Of Assist: Stand by Assist with Front-Wheel Walker  X 100 feet      Plan of Care: Will benefit from Physical Therapy 7 times per week  Discharge Recommendations: Equipment: Front-Wheel Walker. Post-acute therapy Discharge to a transitional care facility for continued skilled therapy services.     See \"Rehab Therapy-Acute\" Patient Summary Report for complete documentation.       "

## 2018-02-15 NOTE — CARE PLAN
Problem: Safety  Goal: Will remain free from injury  Bed in low position, treaded slipper socks on, and call light in reach. Pt instructed to call nurse if ambulating out of bed.       Problem: Pain Management  Goal: Pain level will decrease to patient's comfort goal  Outcome: PROGRESSING AS EXPECTED  Making sure pain is controlled to a tolerable level conducive to ambulation in and out of bed. Assessing pain level while rounding, and medicating prn as ordered by MD.

## 2018-02-15 NOTE — DISCHARGE PLANNING
ELIZA met with this patient to complete choice form for SNF.  Patient stated that he would like to go to [facility]. Completed choice form faxed to JUAN Zayas for referral follow up.

## 2018-02-15 NOTE — CARE PLAN
Problem: Fluid Volume:  Goal: Will maintain balanced intake and output    Intervention: Monitor, educate, and encourage compliance with therapeutic intake of liquids  Patient with good oral fluid intake, encouraged to continue with intake of liquids  Urine output good      Problem: Psychosocial Needs:  Goal: Level of anxiety will decrease    Intervention: Identify and develop with patient strategies to cope with anxiety triggers  Patient anxious, poc communicated with patient for the shift, spent time listening to patients concerns and answered questions

## 2018-02-15 NOTE — CARE PLAN
Problem: Safety  Goal: Will remain free from falls    Intervention: Assess risk factors for falls  Patient educated on risk for fall and verbalized understanding to call for assistance prior to ambulating, call light and personal belongings within reach, toileting offered frequently

## 2018-02-15 NOTE — DISCHARGE PLANNING
Per ANUP Johns, referral has been accepted with [facility] and  transport has been arranged for 1:00 PM today.    Received transport communication form.

## 2018-02-16 NOTE — DISCHARGE SUMMARY
ADDENDUM    A discharge summary was dictated yesterday on the patient when it was felt he   was going to be discharged yesterday.  The physical therapist did not feel   that he was making sufficient progress to be stable and safe at home.  They   recommended a short term of stay in a skilled nursing facility and the patient   has agreed to this.  Today, he continues to do well, but little slow with the   ambulation, has an intact neurologic and vascular exam with soft and   nontender calf and a clean and dry wound.  He will be transferred to the   skilled nursing facility today.  He should as per the previous discharge   summary have physical therapy at least 2-3 times daily.  Weightbearing as   tolerated on the right leg.  Posterior total hip precaution should be   enforced.  He should wear the knee immobilizer in bed when he is sleeping, but   it may be off at all other times.  He may shower with his incision covered.    He does have a followup visit scheduled for our office on 02/27/2018 and   should keep this.  He does have a prescription that I gave him yesterday for   gabapentin 300 mg t.i.d. and receives his pain medications from Dr. Bloom.    _____ does have his pain medications at home and can bring this to the skilled   nursing facility if as necessary.  He should continue his normal preoperative   medications at the discretion of the admitting physician at the skilled   nursing facility.       ____________________________________     MD LC Chavarria / BRET    DD:  02/15/2018 08:29:27  DT:  02/16/2018 06:03:45    D#:  [id number]  Job#:  [id number]

## 2018-03-01 ENCOUNTER — APPOINTMENT (OUTPATIENT)
Dept: RADIOLOGY | Facility: MEDICAL CENTER | Age: 68
End: 2018-03-01
Attending: EMERGENCY MEDICINE
Payer: MEDICARE

## 2018-03-01 ENCOUNTER — HOSPITAL ENCOUNTER (EMERGENCY)
Facility: MEDICAL CENTER | Age: 68
End: 2018-03-01
Attending: EMERGENCY MEDICINE
Payer: MEDICARE

## 2018-03-01 VITALS
SYSTOLIC BLOOD PRESSURE: 118 MMHG | WEIGHT: 275.57 LBS | TEMPERATURE: 97.2 F | RESPIRATION RATE: 16 BRPM | HEIGHT: 76 IN | BODY MASS INDEX: 33.56 KG/M2 | HEART RATE: 89 BPM | DIASTOLIC BLOOD PRESSURE: 63 MMHG | OXYGEN SATURATION: 95 %

## 2018-03-01 DIAGNOSIS — Z96.641 STATUS POST TOTAL HIP REPLACEMENT, RIGHT: ICD-10-CM

## 2018-03-01 DIAGNOSIS — R53.81 PHYSICAL DECONDITIONING: ICD-10-CM

## 2018-03-01 DIAGNOSIS — S73.004A CLOSED DISLOCATION OF RIGHT HIP, INITIAL ENCOUNTER (HCC): Primary | ICD-10-CM

## 2018-03-01 LAB
ALBUMIN SERPL BCP-MCNC: 3.9 G/DL (ref 3.2–4.9)
ALBUMIN/GLOB SERPL: 1.3 G/DL
ALP SERPL-CCNC: 122 U/L (ref 30–99)
ALT SERPL-CCNC: 8 U/L (ref 2–50)
ANION GAP SERPL CALC-SCNC: 9 MMOL/L (ref 0–11.9)
ANISOCYTOSIS BLD QL SMEAR: ABNORMAL
APTT PPP: 24.8 SEC (ref 24.7–36)
AST SERPL-CCNC: 11 U/L (ref 12–45)
BASOPHILS # BLD AUTO: 0.9 % (ref 0–1.8)
BASOPHILS # BLD: 0.09 K/UL (ref 0–0.12)
BILIRUB SERPL-MCNC: 0.3 MG/DL (ref 0.1–1.5)
BUN SERPL-MCNC: 30 MG/DL (ref 8–22)
CALCIUM SERPL-MCNC: 8.7 MG/DL (ref 8.5–10.5)
CHLORIDE SERPL-SCNC: 101 MMOL/L (ref 96–112)
CO2 SERPL-SCNC: 26 MMOL/L (ref 20–33)
CREAT SERPL-MCNC: 0.82 MG/DL (ref 0.5–1.4)
EOSINOPHIL # BLD AUTO: 0.42 K/UL (ref 0–0.51)
EOSINOPHIL NFR BLD: 4.4 % (ref 0–6.9)
ERYTHROCYTE [DISTWIDTH] IN BLOOD BY AUTOMATED COUNT: 48.7 FL (ref 35.9–50)
GLOBULIN SER CALC-MCNC: 3 G/DL (ref 1.9–3.5)
GLUCOSE SERPL-MCNC: 104 MG/DL (ref 65–99)
HCT VFR BLD AUTO: 36.8 % (ref 42–52)
HGB BLD-MCNC: 11.6 G/DL (ref 14–18)
INR PPP: 1.04 (ref 0.87–1.13)
LYMPHOCYTES # BLD AUTO: 2.41 K/UL (ref 1–4.8)
LYMPHOCYTES NFR BLD: 25.4 % (ref 22–41)
MANUAL DIFF BLD: NORMAL
MCH RBC QN AUTO: 29.6 PG (ref 27–33)
MCHC RBC AUTO-ENTMCNC: 31.5 G/DL (ref 33.7–35.3)
MCV RBC AUTO: 93.9 FL (ref 81.4–97.8)
MICROCYTES BLD QL SMEAR: ABNORMAL
MONOCYTES # BLD AUTO: 0.25 K/UL (ref 0–0.85)
MONOCYTES NFR BLD AUTO: 2.6 % (ref 0–13.4)
MORPHOLOGY BLD-IMP: NORMAL
MYELOCYTES NFR BLD MANUAL: 0.9 %
NEUTROPHILS # BLD AUTO: 6.25 K/UL (ref 1.82–7.42)
NEUTROPHILS NFR BLD: 65.8 % (ref 44–72)
NRBC # BLD AUTO: 0 K/UL
NRBC BLD-RTO: 0 /100 WBC
PLATELET # BLD AUTO: 534 K/UL (ref 164–446)
PLATELET BLD QL SMEAR: NORMAL
PMV BLD AUTO: 9.6 FL (ref 9–12.9)
POTASSIUM SERPL-SCNC: 4.6 MMOL/L (ref 3.6–5.5)
PROT SERPL-MCNC: 6.9 G/DL (ref 6–8.2)
PROTHROMBIN TIME: 13.3 SEC (ref 12–14.6)
RBC # BLD AUTO: 3.92 M/UL (ref 4.7–6.1)
RBC BLD AUTO: PRESENT
SODIUM SERPL-SCNC: 136 MMOL/L (ref 135–145)
WBC # BLD AUTO: 9.5 K/UL (ref 4.8–10.8)

## 2018-03-01 PROCEDURE — 700111 HCHG RX REV CODE 636 W/ 250 OVERRIDE (IP): Performed by: EMERGENCY MEDICINE

## 2018-03-01 PROCEDURE — 85007 BL SMEAR W/DIFF WBC COUNT: CPT

## 2018-03-01 PROCEDURE — 700102 HCHG RX REV CODE 250 W/ 637 OVERRIDE(OP): Performed by: EMERGENCY MEDICINE

## 2018-03-01 PROCEDURE — 73502 X-RAY EXAM HIP UNI 2-3 VIEWS: CPT | Mod: RT

## 2018-03-01 PROCEDURE — 700111 HCHG RX REV CODE 636 W/ 250 OVERRIDE (IP)

## 2018-03-01 PROCEDURE — 36415 COLL VENOUS BLD VENIPUNCTURE: CPT

## 2018-03-01 PROCEDURE — 96376 TX/PRO/DX INJ SAME DRUG ADON: CPT

## 2018-03-01 PROCEDURE — 27265 TREAT HIP DISLOCATION: CPT

## 2018-03-01 PROCEDURE — A9270 NON-COVERED ITEM OR SERVICE: HCPCS | Performed by: EMERGENCY MEDICINE

## 2018-03-01 PROCEDURE — 99285 EMERGENCY DEPT VISIT HI MDM: CPT

## 2018-03-01 PROCEDURE — 96374 THER/PROPH/DIAG INJ IV PUSH: CPT

## 2018-03-01 PROCEDURE — 85610 PROTHROMBIN TIME: CPT

## 2018-03-01 PROCEDURE — 85027 COMPLETE CBC AUTOMATED: CPT

## 2018-03-01 PROCEDURE — 85730 THROMBOPLASTIN TIME PARTIAL: CPT

## 2018-03-01 PROCEDURE — 73501 X-RAY EXAM HIP UNI 1 VIEW: CPT | Mod: RT

## 2018-03-01 PROCEDURE — 96375 TX/PRO/DX INJ NEW DRUG ADDON: CPT

## 2018-03-01 PROCEDURE — 80053 COMPREHEN METABOLIC PANEL: CPT

## 2018-03-01 RX ORDER — MORPHINE SULFATE 4 MG/ML
4 INJECTION, SOLUTION INTRAMUSCULAR; INTRAVENOUS ONCE
Status: COMPLETED | OUTPATIENT
Start: 2018-03-01 | End: 2018-03-01

## 2018-03-01 RX ORDER — MORPHINE SULFATE 4 MG/ML
INJECTION, SOLUTION INTRAMUSCULAR; INTRAVENOUS
Status: COMPLETED
Start: 2018-03-01 | End: 2018-03-01

## 2018-03-01 RX ORDER — PROPOFOL 10 MG/ML
INJECTION, EMULSION INTRAVENOUS
Status: DISCONTINUED | OUTPATIENT
Start: 2018-03-01 | End: 2018-03-01 | Stop reason: HOSPADM

## 2018-03-01 RX ORDER — OXYCODONE HYDROCHLORIDE AND ACETAMINOPHEN 5; 325 MG/1; MG/1
1 TABLET ORAL ONCE
Status: COMPLETED | OUTPATIENT
Start: 2018-03-01 | End: 2018-03-01

## 2018-03-01 RX ORDER — ONDANSETRON 4 MG/1
4 TABLET, ORALLY DISINTEGRATING ORAL ONCE
Status: COMPLETED | OUTPATIENT
Start: 2018-03-01 | End: 2018-03-01

## 2018-03-01 RX ORDER — METHOCARBAMOL 500 MG/1
500 TABLET, FILM COATED ORAL ONCE
Status: COMPLETED | OUTPATIENT
Start: 2018-03-01 | End: 2018-03-01

## 2018-03-01 RX ORDER — OXYCODONE HYDROCHLORIDE AND ACETAMINOPHEN 5; 325 MG/1; MG/1
1-2 TABLET ORAL EVERY 4 HOURS PRN
Qty: 15 TAB | Refills: 0 | Status: SHIPPED | OUTPATIENT
Start: 2018-03-01 | End: 2018-03-04

## 2018-03-01 RX ADMIN — ONDANSETRON 4 MG: 4 TABLET, ORALLY DISINTEGRATING ORAL at 10:37

## 2018-03-01 RX ADMIN — OXYCODONE AND ACETAMINOPHEN 1 TABLET: 5; 325 TABLET ORAL at 10:38

## 2018-03-01 RX ADMIN — METHOCARBAMOL 500 MG: 500 TABLET ORAL at 08:17

## 2018-03-01 RX ADMIN — MORPHINE SULFATE 4 MG: 4 INJECTION INTRAVENOUS at 08:18

## 2018-03-01 RX ADMIN — MORPHINE SULFATE 4 MG: 4 INJECTION INTRAVENOUS at 06:02

## 2018-03-01 RX ADMIN — MORPHINE SULFATE 4 MG: 4 INJECTION, SOLUTION INTRAMUSCULAR; INTRAVENOUS at 07:08

## 2018-03-01 RX ADMIN — MORPHINE SULFATE 4 MG: 4 INJECTION INTRAVENOUS at 07:08

## 2018-03-01 RX ADMIN — PROPOFOL 100 MG: 10 INJECTION, EMULSION INTRAVENOUS at 07:21

## 2018-03-01 ASSESSMENT — PAIN SCALES - GENERAL
PAINLEVEL_OUTOF10: 10
PAINLEVEL_OUTOF10: 9

## 2018-03-01 ASSESSMENT — LIFESTYLE VARIABLES: DO YOU DRINK ALCOHOL: NO

## 2018-03-01 NOTE — DISCHARGE INSTRUCTIONS
Patient is medically stable for discharge and transfer to [facility] for further evaluation and rehabilitation.    Continue home medications as previously indicated.    Activity and weightbearing as previously indicated by orthopedic surgery and physical therapy.    Return to the emergency department for persistent or worsening hip pain, extremity weakness, paresthesias, discoloration or other new concerns.    Hip Dislocation  A hip dislocation happens when your thigh bone (the ball) separates from your hip bone (the socket). Hip dislocation is an emergency. If you think you have a hip dislocation and cannot move your leg, get help right away. Do not try to move.   Your doctor will put your thigh bone back in the joint (the ball back in the socket). Sometimes this can be done without surgery. Surgery may be needed if blood vessels or nerves are damaged, or if the ball cannot be put back into the socket by hand.  HOME CARE  · Rest your injured joint. Do not move it.  · Avoid the activity that caused your injury.  · Put ice on your injured joint for 1 to 2 days or as told by your doctor.  ¨ Put ice in a plastic bag.  ¨ Place a towel between your skin and the bag.  ¨ Leave the ice on for 15 to 20 minutes, every 2 hours while you are awake.  · Use crutches or a walker as told by your doctor.  · Exercise your hip and leg as told by your doctor.  · Only take medicines as told by your doctor.  GET HELP RIGHT AWAY IF:  · Your pain gets worse, not better.  · You feel like your hip has dislocated again.  MAKE SURE YOU:  · Understand these instructions.  · Will watch your condition.  · Will get help right away if you are not doing well or get worse.     This information is not intended to replace advice given to you by your health care provider. Make sure you discuss any questions you have with your health care provider.     Document Released: 08/15/2012 Document Revised: 01/08/2016 Document Reviewed:  08/15/2012  Elsevier Interactive Patient Education ©2016 [employer]

## 2018-03-01 NOTE — ED NOTES
"Attempted to ambulate pt w/ a walker.  2 person assist to get pt oob.  Pt took a few steps into the hallway and requested to go back to room.  Semi-steady when ambulating w/ walker.  Pt c/o nausea & feeling \"winded\".  Pt assisted to bed.  sats 88% & .  Pt reports feeling like he should not be discharged today.  ERP notified.   "

## 2018-03-01 NOTE — ED TRIAGE NOTES
"BIB EMS     Chief Complaint   Patient presents with   • Dislocation     pt has right leg pain and slight shortness compared to left leg. pt reports having pain earlier in night and went to lay down, when he heard \"a grinding sound\" followed by extreme pain 10/10     Pt hx of hip replacement on 2/12/2018    PTA pt given 1 mg versed, 150 mcg fentanyl, 4 mg zofran.     FSBG 105    Pt in gown, chart up for ERP.    Pt has shortned right leg,  pedial pulses present.   "

## 2018-03-01 NOTE — ED PROVIDER NOTES
"ED Provider Note    CHIEF COMPLAINT  Chief Complaint   Patient presents with   • Dislocation     pt has right leg pain and slight shortness compared to left leg. pt reports having pain earlier in night and went to lay down, when he heard \"a grinding sound\" followed by extreme pain 10/10       HPI  Addison Damon is a 67 y.o. male who presents to the emergency department by ambulance from home for right hip pain. Patient status post right hip replacement 2 weeks ago, spent some time in a skilled nursing facility/rehab following the procedure and is recently gone home. Patient was sitting on the side of his bed tonight when he lifted his legs to get into bed and felt a pop at the hip joint. Pain since that time. 10 out of 10 prior to arrival, fentanyl and Versed with EMS, now tolerable, 4 out of 10, constant, aching and sharp with any range of motion. No paresthesias.    REVIEW OF SYSTEMS  See HPI for further details. All other systems are negative.     PAST MEDICAL HISTORY   has a past medical history of Arthritis; Frequent falls; Hypertension; OA (osteoarthritis) of hip; Pain (01/29/2018); Peptic ulcer disease; and Vitamin D deficiency.    SOCIAL HISTORY  Social History     Social History Main Topics   • Smoking status: Former Smoker     Packs/day: 1.00     Years: 35.00     Types: Cigarettes     Quit date: 8/1/2003   • Smokeless tobacco: Never Used   • Alcohol use No   • Drug use: No   • Sexual activity: Not on file       SURGICAL HISTORY   has a past surgical history that includes lumbar laminectomy diskectomy (4/4/2016); cervical fusion posterior (11/2015); vagotomy (1986); and hip arthroplasty total (Right, 2/12/2018).    CURRENT MEDICATIONS  Home Medications    **Home medications have not yet been reviewed for this encounter**         ALLERGIES  Allergies   Allergen Reactions   • Bactrim Ds [Sulfamethoxazole-Trimethoprim] Rash     Itchy, Flaky, Rash   • Xarelto [Rivaroxaban]      FAINTED.       PHYSICAL " "EXAM  VITAL SIGNS: /63   Pulse (!) 110   Temp 36.2 °C (97.2 °F)   Resp 14   Ht 1.93 m (6' 4\")   Wt 125 kg (275 lb 9.2 oz)   SpO2 94%   BMI 33.54 kg/m²   Pulse ox interpretation: I interpret this pulse ox as normal.  Constitutional: Alert in no apparent distress. Disheveled  HENT: Normocephalic, atraumatic. Bilateral external ears normal, Nose normal. Slightly dry mucous membranes.    Eyes: Pupils are equal and reactive, Conjunctiva normal.   Neck: Normal range of motion, Supple.   Cardiovascular: Normal peripheral perfusion.  Thorax & Lungs: Nonlabored respirations.  Abdomen: Obese, soft, nondistended and nontender to palpation.  Skin: Warm, Dry.  Musculoskeletal: Right lower extremity shortened. Pain with palpation at the lateral hip. 2+ DP, approximately 2 2nd capillary refill, sensation to light touch grossly intact. Pelvis otherwise stable.  Neurologic: Alert , no gross focal deficit noted.  Psychiatric: Affect normal, Judgment normal, Mood normal.       DIAGNOSTIC STUDIES / PROCEDURES    LABS  Results for orders placed or performed during the hospital encounter of 03/01/18   CBC w/ Differential   Result Value Ref Range    WBC 9.5 4.8 - 10.8 K/uL    RBC 3.92 (L) 4.70 - 6.10 M/uL    Hemoglobin 11.6 (L) 14.0 - 18.0 g/dL    Hematocrit 36.8 (L) 42.0 - 52.0 %    MCV 93.9 81.4 - 97.8 fL    MCH 29.6 27.0 - 33.0 pg    MCHC 31.5 (L) 33.7 - 35.3 g/dL    RDW 48.7 35.9 - 50.0 fL    Platelet Count 534 (H) 164 - 446 K/uL    MPV 9.6 9.0 - 12.9 fL    Neutrophils-Polys 65.80 44.00 - 72.00 %    Lymphocytes 25.40 22.00 - 41.00 %    Monocytes 2.60 0.00 - 13.40 %    Eosinophils 4.40 0.00 - 6.90 %    Basophils 0.90 0.00 - 1.80 %    Nucleated RBC 0.00 /100 WBC    Neutrophils (Absolute) 6.25 1.82 - 7.42 K/uL    Lymphs (Absolute) 2.41 1.00 - 4.80 K/uL    Monos (Absolute) 0.25 0.00 - 0.85 K/uL    Eos (Absolute) 0.42 0.00 - 0.51 K/uL    Baso (Absolute) 0.09 0.00 - 0.12 K/uL    NRBC (Absolute) 0.00 K/uL    Anisocytosis 1+     " Microcytosis 1+    Complete Metabolic Panel (CMP)   Result Value Ref Range    Sodium 136 135 - 145 mmol/L    Potassium 4.6 3.6 - 5.5 mmol/L    Chloride 101 96 - 112 mmol/L    Co2 26 20 - 33 mmol/L    Anion Gap 9.0 0.0 - 11.9    Glucose 104 (H) 65 - 99 mg/dL    Bun 30 (H) 8 - 22 mg/dL    Creatinine 0.82 0.50 - 1.40 mg/dL    Calcium 8.7 8.5 - 10.5 mg/dL    AST(SGOT) 11 (L) 12 - 45 U/L    ALT(SGPT) 8 2 - 50 U/L    Alkaline Phosphatase 122 (H) 30 - 99 U/L    Total Bilirubin 0.3 0.1 - 1.5 mg/dL    Albumin 3.9 3.2 - 4.9 g/dL    Total Protein 6.9 6.0 - 8.2 g/dL    Globulin 3.0 1.9 - 3.5 g/dL    A-G Ratio 1.3 g/dL   Prothrombin (PT/INR)   Result Value Ref Range    PT 13.3 12.0 - 14.6 sec    INR 1.04 0.87 - 1.13   APTT   Result Value Ref Range    APTT 24.8 24.7 - 36.0 sec   ESTIMATED GFR   Result Value Ref Range    GFR If African American >60 >60 mL/min/1.73 m 2    GFR If Non African American >60 >60 mL/min/1.73 m 2   DIFFERENTIAL MANUAL   Result Value Ref Range    Myelocytes 0.90 %    Manual Diff Status PERFORMED    PERIPHERAL SMEAR REVIEW   Result Value Ref Range    Peripheral Smear Review see below    PLATELET ESTIMATE   Result Value Ref Range    Plt Estimation Increased    MORPHOLOGY   Result Value Ref Range    RBC Morphology Present      RADIOLOGY  DX-HIP-COMPLETE - UNILATERAL 2+ RIGHT   Final Result      Interval reduction of previous right hip arthroplasty dislocation.      DX-HIP-UNILATERAL-WITH PELVIS-1 VIEW RIGHT   Final Result      Right hip prosthetic dislocation as described above.        PROCEDURE  REDUCTION PROCEDURE NOTE:  Patient identification was confirmed, consent was obtained verbally..  This procedure was performed at Dr. Beltran  Site right hip  Anesthetic used (type and amt): Propofol  Pre-procedure N/V exam: Intact  # of attempts: 1  Type of splint: Not applicable  Pt anesthetized, fx/dislocation reduced successfully. Patient tolerated procedure well without complications. Patient splinted.  Post-procedure exam indicates patient is n/v intact distal to the injury site. Post-procedure films show excellent alignment. Patient  returned to baseline prior to disposition.    CONSCIOUS SEDATION PROCEDURE NOTE:  Patient identification was confirmed and patient consent was obtain verbally.  The procedure was conducted by Dr. Beltran  Anesthetic used: Propofol 100 mg   Length of Time: See nurse's notes  Other medications administered: Morphine for analgesia  Pre-procedure neuro examination revealed no deficits. Patient's airway remained patent, O2SAT adequate through procedure. Vitals remained stable. Patient tolerated procedure well without complications. Post-procedure exam showed patient w/o cranial deficits. Sensory and motor intact. Patient returned to baseline prior to disposition.      COURSE & MEDICAL DECISION MAKING  Nursing notes and vital signs were reviewed. (See chart for details)  The patients records were reviewed, history was obtained from the patient;     5:35 AM Dr. Fink aware of the patient and XR results.  No indication not to attempt reduction in the emergency department. Routine discharge instructions a successful. Otherwise recontact.    0730 -procedural sedation with appropriate reduction of right hip dislocation as described above. Patient tolerated sedation well. Patient continues to complain of right hip pain. Additional morphine, Robaxin given.    0845 - patient with persistent discomfort, although much improved after last dosing of medication. Patient does not feel as though he is safe for discharge home. Patient required 2 person assist with use of walker (baseline at this time) and was only able to travel a couple of steps. Patient felt lightheaded and weak. No syncope.    0900 -  has been contacted for possible SNF placement per patient request rather than readmission to the hospital.    10:06 AM [facility] I believe this is  appropriate disposition this patient appears significantly deconditioned following his total hip replacement. Transfer arrangements will be arranged by . Patient and his son are aware of the disposition and agreeable at this time.      FINAL IMPRESSION  (S73.004A) Closed dislocation of right hip, initial encounter (CMS-[facility])  (primary encounter diagnosis)  (Z96.641) Status post total hip replacement, right  (R53.81) Physical deconditioning      Electronically signed by: Karolina Beltran, 3/1/2018 4:36 AM      This dictation was created using voice recognition software. The accuracy of the dictation is limited to the abilities of the software. I expect there may be some errors of grammar and possibly content. The nursing notes were reviewed and certain aspects of this information were incorporated into this note.

## 2018-03-01 NOTE — DISCHARGE PLANNING
Per Jenny at [facility], pt will be accepted back to the facility.  ER notes faxed. Dr. Beltran aware. Pt aware and agreeable to returning.  Records faxed over for review.       PCS faxed to [facility], transport to [facility] will be at 1230.  Chart copied  Cobra completed  Son aware and agreeable.  No other needs verbalized/id'd by pt/staff/MD

## 2018-03-01 NOTE — ED NOTES
Report received, assuming care.  RT, ERP, RN, tech at bedside, Conscious sedation for R hip reduction completed.  Pt groggy after, rouses w/ to voice.  Pt remains on 15L NRB during post procedure recovery.  Post reduction xray at bedside.

## 2018-03-01 NOTE — ED NOTES
Pt medicated for c/o continued R hip pain & nausea.  Pt given water per his request, pt voided 500cc's per urinal, pt adjusted in bed to his R side per his request.  [facility]

## 2018-03-01 NOTE — ED NOTES
Pt resting on gurney, eyes closed, resp even and unlabored.  Pt scheduled for transport at 1200. Report called to Radha GODOY at [facility].

## 2018-03-29 NOTE — CARE PLAN
POST COLONOSCOPY CARE    During your procedure today we found: We removed 1 polyp and sent it to lab.       You may experience abdominal bloating or cramps due to air used to inflate the colon during the procedure.  This is common and can be relieved by walking, lying on your left side with knees bent, and passing gas.  A small amount of rectal bleeding may occur, especially if polyps or biopsies were removed. Do not use laxatives or enemas for one week following your procedure.    Your last dose of pain medication was at______N/A__________    If any of the following problems occur, call us at [phone].    Severe pain/ discomfort in abdomen  Nausea and/or vomiting  Temperature above 101 degrees F  New/ increased bleeding from mouth or rectum, or black, tarry stools  Abdominal bloating not relieved by belching or passing gas  Chest pain or shortness of breath    Follow Up Plan:  1. Resume your regular diet  2. Clinic will follow up with pathology results within 1 week  3. Reschedule colonoscopy in 5 years    Due to sedation you received, you may not remember the procedure or the doctor’s explanation afterward.  Our medications sometimes cause dizziness, drowsiness and impaired judgment.  Therefore, please follow these recommendations for the next 24 hours.    Do not drive a car or operate any machinery. Have a responsible adult drive you home.  Do not make critical decisions or sign any legal documents.  Do not drink alcohol  Do not return to work, or perform any tasks that require coordinated activity    Patient and patient representative have verbalized  understanding of these instructions and have  received a copy.     We would like to take this opportunity to thank you for choosing [facility]. Because your confidence in your caregivers is very important to us, it is our commitment to always treat you and your family with courtesy and respect. Our goal is to always answer any questions or  Problem: Safety  Goal: Will remain free from injury  Outcome: PROGRESSING AS EXPECTED    Intervention: Provide assistance with mobility   02/13/18 4719   OTHER   Assistance / Tolerance Assistance of One;Tolerates Well   Pt educated to call for assistance when needed to ambulate to bathroom or in hallways      Problem: Venous Thromboembolism (VTW)/Deep Vein Thrombosis (DVT) Prevention:  Goal: Patient will participate in Venous Thrombosis (VTE)/Deep Vein Thrombosis (DVT)Prevention Measures  Outcome: PROGRESSING AS EXPECTED   02/12/18 2300   OTHER   Risk Assessment Score 4   VTE RISK High   Pharmacologic Prophylaxis Used (ASA)   Mechanical/VTE Prophylaxis   Mechanical Prophylaxis  SCDs, Sequential Compression Device   WILBER Hose (Graduated Compression Stockings) On   SCDs, Sequential Compression Device On          worries or concerns you may have about the care you received If you have any suggestions for improvement or worries or concerns please do not hesitate to let us know.                                                                         Signature of  Patient Representative:             Signature of Discharge Nurse :                                Date:  3/29/18  Time:

## 2019-05-20 NOTE — PROGRESS NOTES
"Post op day # 1    No New Complaints, moderate pain, sore with motion.  Does not feel ready for home    Blood pressure 145/77, pulse 92, temperature 36.7 °C (98 °F), resp. rate 18, height 1.93 m (6' 4\"), weight 122.5 kg (270 lb 1 oz), SpO2 98 %.    Neurovascular intact  Wound Clean and Dry    Recent Labs      02/13/18   0519   WBC  8.2   RBC  3.89*   HEMOGLOBIN  12.1*   HEMATOCRIT  37.0*   MCV  95.1   MCH  31.1   MCHC  32.7*   RDW  51.0*   PLATELETCT  238   MPV  9.7     Recent Labs      02/13/18   0519   SODIUM  135   POTASSIUM  4.1   CHLORIDE  101   CO2  27   GLUCOSE  117*   BUN  15   CREATININE  0.88   CALCIUM  8.6       Plan: Begin mobilization, WBAT on right.  Immobilizer on only when in bed asleep.  Aim for home tomorrow  "
"Post op day # 2    No New Complaints, pain better controlled    Blood pressure 139/59, pulse 88, temperature 36.7 °C (98.1 °F), resp. rate 18, height 1.93 m (6' 4\"), weight 122.5 kg (270 lb 1 oz), SpO2 100 %.    Neurovascular intact  Wound Clean and Dry    Recent Labs      02/13/18   0519   WBC  8.2   RBC  3.89*   HEMOGLOBIN  12.1*   HEMATOCRIT  37.0*   MCV  95.1   MCH  31.1   MCHC  32.7*   RDW  51.0*   PLATELETCT  238   MPV  9.7     Recent Labs      02/13/18   0519   SODIUM  135   POTASSIUM  4.1   CHLORIDE  101   CO2  27   GLUCOSE  117*   BUN  15   CREATININE  0.88   CALCIUM  8.6       Plan: Home after pm PT.  WBAT on right, Routine BRANDEN precautions.  Wear immobilizer at night when sleeping. Please remove IV and change dressing before discharge.  I will arrange outpt PT.  May shower with incision covered. Aspirin 325 mg po bid.  "
"Post op day # 3    No New Complaints, continues to improve.  PT yesterday felt pt was not independent for home; recommended SNF    Blood pressure 119/67, pulse 92, temperature 36.6 °C (97.9 °F), resp. rate 18, height 1.93 m (6' 4\"), weight 122.5 kg (270 lb 1 oz), SpO2 99 %.    Neurovascular intact  Wound Clean and Dry    Recent Labs      02/13/18   0519   WBC  8.2   RBC  3.89*   HEMOGLOBIN  12.1*   HEMATOCRIT  37.0*   MCV  95.1   MCH  31.1   MCHC  32.7*   RDW  51.0*   PLATELETCT  238   MPV  9.7     Recent Labs      02/13/18   0519   SODIUM  135   POTASSIUM  4.1   CHLORIDE  101   CO2  27   GLUCOSE  117*   BUN  15   CREATININE  0.88   CALCIUM  8.6       Plan: OK with SNF.  WBAT on right. Posterior BRANDEN precautions.  Immobilizer on when in bed sleeping.  F/u my office 2/27/18  "
"Pt complains of SOB, this RN checked on pt.   Respirations @19 cpm,  o2 saturation on room air at 98%.  Pt requesting o2, this RN put pt at 1-2L via oxymask, saturation at 100%. Other vital signs WNL.  Pt complained that he is not getting enough o2,  oxymask switched to nasal cannula. Pt educated that he is getting enough oxygen.    Pt states he has a \"panic attack\" feeling and requests for xanax.     Dr. Hanley paged awaiting for call back.    [id number] Call back received, notified about pt complaints, also about increased sweating and voiding. orders received for Xanax 0.5mg Once.  "
"Total Joint Replacement Program Rounding     Inpatient bedside rounding completed to address quality of care provided by total joint replacement program IDT and overall patient experience at [facility].    Performance Measures addressed: Pt states he did receive and review the GUIDE to prepare for surgery  Patient Satisfaction addressed: Plans to stay overnight to further therapy and pain control, then DC tomorrow. [place]ant he's \"not going to a SNF\".   Additional notes: Patient/family updated on POC during hospital stay. 2 warm blankets and room temperature adjusted for patient comfort. No other questions/concerns. RN and CNA updated.         "
1900-report received, assumed care of pt, discussed poc with patient, pt up to BR with fww and steady gait, needs assistance of 1 with generalized weakness, dressing to right hip removed by patient during moving around in bed, changed dressing. CMS+, denies numbness & tingling, safety precautions in place, educated to call for assistance with ambulation and for pain, verbalized understanding  
2 RN skin assessment done; skin not WDL. Sx incision to R hip    
Called Dr. Hanley to update that PT recommends SNF and pt is agreeable. Plan for SNF tx tomorrow, 2/15.  
Educated patient and informed that his son needs to bring his home medications to the SNF.   
Patient transferred to [facility] with Kareem (escort) via wheelchair. Personal belongings sent with patient.   
Patient with frequency of urine, large urine output  
Provided report to Karina at [facility].   
Pt A&Ox4, sitting up in bed. Pt states 8/10 pain to the RT hip. Surgical dressing in place, CDI. +CMS. Pt able to dorsi/plantar flex w/out difficulty. Pt notified to call for assistance.   
Pt able to dorsi and plantar flex bilaterally. SCDs in place.   
Pt sleeping at this time.   
Received report from night RN; patient resting and calm in bed.   
Received report from night RN; patient resting in bed.   
Received report, assumed pt care. Discussed POC. Encouraged CDB and leg exercises while in bed. Pt taught to inform nurse if experiencing pain above comfort goal, SOB, chest pain or for any further assistance. Assessment done. VSS. Will cont to monitor.    
Report given to Day RN.   
Report received from Day RN. Pt awake and alert. States no needs at this time. CMS intact.   
***GEN - NAD; well appearing; A+O x3   ***PULMONARY - CTA b/l, symmetric breath sounds. ***CARDIAC -s1s2, RRR, no M,G,R  ***ABDOMEN - ND, NT, soft, no guarding, no rebound, no unique's   ***SKIN - no rash or bruising   ***NEUROLOGIC - alert and oriented, follows commands, sensation nl, motor nl, ***PSYCH - insight and judgment nl, memory nl, affect nl, thought nl

## 2019-06-11 ENCOUNTER — APPOINTMENT (OUTPATIENT)
Dept: BEHAVIORAL HEALTH | Facility: CLINIC | Age: 69
End: 2019-06-11
Payer: MEDICARE

## 2020-04-06 ENCOUNTER — HOSPITAL ENCOUNTER (OUTPATIENT)
Dept: RADIOLOGY | Facility: MEDICAL CENTER | Age: 70
End: 2020-04-06
Attending: CLINICAL NURSE SPECIALIST
Payer: MEDICARE

## 2020-04-06 DIAGNOSIS — M54.12 BRACHIAL NEURITIS: ICD-10-CM

## 2020-04-06 PROCEDURE — 72141 MRI NECK SPINE W/O DYE: CPT

## 2020-04-15 ENCOUNTER — HOSPITAL ENCOUNTER (OUTPATIENT)
Facility: MEDICAL CENTER | Age: 70
End: 2020-04-15
Attending: NEUROLOGICAL SURGERY | Admitting: NEUROLOGICAL SURGERY
Payer: MEDICARE

## 2020-04-16 VITALS — HEIGHT: 76 IN | BODY MASS INDEX: 38.36 KG/M2 | WEIGHT: 315 LBS

## 2020-04-16 DIAGNOSIS — Z01.812 PRE-OPERATIVE LABORATORY EXAMINATION: ICD-10-CM

## 2020-04-16 DIAGNOSIS — Z01.810 PRE-OPERATIVE CARDIOVASCULAR EXAMINATION: ICD-10-CM

## 2020-04-16 LAB
ABO GROUP BLD: NORMAL
ANION GAP SERPL CALC-SCNC: 15 MMOL/L (ref 7–16)
APPEARANCE UR: CLEAR
APTT PPP: 31.3 SEC (ref 24.7–36)
BASOPHILS # BLD AUTO: 0.7 % (ref 0–1.8)
BASOPHILS # BLD: 0.05 K/UL (ref 0–0.12)
BILIRUB UR QL STRIP.AUTO: NEGATIVE
BLD GP AB SCN SERPL QL: NORMAL
BUN SERPL-MCNC: 11 MG/DL (ref 8–22)
CALCIUM SERPL-MCNC: 9.6 MG/DL (ref 8.5–10.5)
CHLORIDE SERPL-SCNC: 102 MMOL/L (ref 96–112)
CO2 SERPL-SCNC: 25 MMOL/L (ref 20–33)
COLOR UR: YELLOW
CREAT SERPL-MCNC: 0.87 MG/DL (ref 0.5–1.4)
EKG IMPRESSION: NORMAL
EOSINOPHIL # BLD AUTO: 0.18 K/UL (ref 0–0.51)
EOSINOPHIL NFR BLD: 2.6 % (ref 0–6.9)
ERYTHROCYTE [DISTWIDTH] IN BLOOD BY AUTOMATED COUNT: 46.9 FL (ref 35.9–50)
GLUCOSE SERPL-MCNC: 109 MG/DL (ref 65–99)
GLUCOSE UR STRIP.AUTO-MCNC: NEGATIVE MG/DL
HCT VFR BLD AUTO: 41.7 % (ref 42–52)
HGB BLD-MCNC: 13.7 G/DL (ref 14–18)
IMM GRANULOCYTES # BLD AUTO: 0.03 K/UL (ref 0–0.11)
IMM GRANULOCYTES NFR BLD AUTO: 0.4 % (ref 0–0.9)
INR PPP: 1.03 (ref 0.87–1.13)
KETONES UR STRIP.AUTO-MCNC: NEGATIVE MG/DL
LEUKOCYTE ESTERASE UR QL STRIP.AUTO: NEGATIVE
LYMPHOCYTES # BLD AUTO: 1.44 K/UL (ref 1–4.8)
LYMPHOCYTES NFR BLD: 20.9 % (ref 22–41)
MCH RBC QN AUTO: 31.1 PG (ref 27–33)
MCHC RBC AUTO-ENTMCNC: 32.9 G/DL (ref 33.7–35.3)
MCV RBC AUTO: 94.8 FL (ref 81.4–97.8)
MICRO URNS: NORMAL
MONOCYTES # BLD AUTO: 0.58 K/UL (ref 0–0.85)
MONOCYTES NFR BLD AUTO: 8.4 % (ref 0–13.4)
NEUTROPHILS # BLD AUTO: 4.6 K/UL (ref 1.82–7.42)
NEUTROPHILS NFR BLD: 67 % (ref 44–72)
NITRITE UR QL STRIP.AUTO: NEGATIVE
NRBC # BLD AUTO: 0 K/UL
NRBC BLD-RTO: 0 /100 WBC
PH UR STRIP.AUTO: 6 [PH] (ref 5–8)
PLATELET # BLD AUTO: 287 K/UL (ref 164–446)
PMV BLD AUTO: 9.9 FL (ref 9–12.9)
POTASSIUM SERPL-SCNC: 4.8 MMOL/L (ref 3.6–5.5)
PROT UR QL STRIP: NEGATIVE MG/DL
PROTHROMBIN TIME: 13.7 SEC (ref 12–14.6)
RBC # BLD AUTO: 4.4 M/UL (ref 4.7–6.1)
RBC UR QL AUTO: NEGATIVE
RH BLD: NORMAL
SODIUM SERPL-SCNC: 142 MMOL/L (ref 135–145)
SP GR UR STRIP.AUTO: 1.01
UROBILINOGEN UR STRIP.AUTO-MCNC: 0.2 MG/DL
WBC # BLD AUTO: 6.9 K/UL (ref 4.8–10.8)

## 2020-04-16 PROCEDURE — 93010 ELECTROCARDIOGRAM REPORT: CPT | Performed by: INTERNAL MEDICINE

## 2020-04-16 PROCEDURE — 86901 BLOOD TYPING SEROLOGIC RH(D): CPT

## 2020-04-16 PROCEDURE — 85730 THROMBOPLASTIN TIME PARTIAL: CPT

## 2020-04-16 PROCEDURE — 86900 BLOOD TYPING SEROLOGIC ABO: CPT

## 2020-04-16 PROCEDURE — 85610 PROTHROMBIN TIME: CPT

## 2020-04-16 PROCEDURE — 93005 ELECTROCARDIOGRAM TRACING: CPT

## 2020-04-16 PROCEDURE — 85025 COMPLETE CBC W/AUTO DIFF WBC: CPT

## 2020-04-16 PROCEDURE — 36415 COLL VENOUS BLD VENIPUNCTURE: CPT

## 2020-04-16 PROCEDURE — 80048 BASIC METABOLIC PNL TOTAL CA: CPT

## 2020-04-16 PROCEDURE — 81003 URINALYSIS AUTO W/O SCOPE: CPT

## 2020-04-16 PROCEDURE — 86850 RBC ANTIBODY SCREEN: CPT

## 2020-04-16 RX ORDER — ACETAMINOPHEN 325 MG/1
650 TABLET ORAL EVERY 6 HOURS PRN
COMMUNITY

## 2020-04-16 RX ORDER — DIPHENHYDRAMINE HCL 25 MG
25 TABLET ORAL NIGHTLY PRN
COMMUNITY

## 2021-01-15 DIAGNOSIS — Z23 NEED FOR VACCINATION: ICD-10-CM

## 2024-07-09 NOTE — ED NOTES
Report to WALT Weeks.    Rx Refill Note  Requested Prescriptions     Pending Prescriptions Disp Refills    levothyroxine (SYNTHROID, LEVOTHROID) 75 MCG tablet [Pharmacy Med Name: LEVOTHYROXINE 75 MCG TABLET] 90 tablet 1     Sig: TAKE 1 TABLET BY MOUTH EVERY DAY      Last office visit with prescribing clinician: 4/17/2024   Last telemedicine visit with prescribing clinician: Visit date not found   Next office visit with prescribing clinician: 7/17/2024                         Would you like a call back once the refill request has been completed: [] Yes [] No    If the office needs to give you a call back, can they leave a voicemail: [] Yes [] No    Charlie Johnson  07/09/24, 09:05 EDT

## (undated) DEVICE — KIT ANESTHESIA W/CIRCUIT & 3/LT BAG W/FILTER (20EA/CA)

## (undated) DEVICE — HEAD HOLDER JUNIOR/ADULT

## (undated) DEVICE — DRILL BIT

## (undated) DEVICE — IMMOBILIZER SHOULDER LARGE - UNIVERSAL (1/EA)

## (undated) DEVICE — GOWN WARMING STANDARD FLEX - (30/CA)

## (undated) DEVICE — TUBE CONNECTING SUCTION - CLEAR PLASTIC STERILE 72 IN (50EA/CA)

## (undated) DEVICE — ELECTRODE 850 FOAM ADHESIVE - HYDROGEL RADIOTRNSPRNT (50/PK)

## (undated) DEVICE — TAPE CLOTH MEDIPORE 6 INCH - (12RL/CA)

## (undated) DEVICE — NEPTUNE 4 PORT MANIFOLD - (20/PK)

## (undated) DEVICE — GLOVE BIOGEL PI ORTHO SZ 7.5 PF LF (40PR/BX)

## (undated) DEVICE — DRESSING 3X8 ADAPTIC GAUZE - NON-ADHERING (36/PK 6PK/BX)

## (undated) DEVICE — PACK TOTAL HIP - (1/CA)

## (undated) DEVICE — PROTECTOR ULNA NERVE - (36PR/CA)

## (undated) DEVICE — SUTURE GENERAL

## (undated) DEVICE — SUTURE 2-0 VICRYL PLUS CT-1 36 (36PK/BX)"

## (undated) DEVICE — ELECTRODE DUAL RETURN W/ CORD - (50/PK)

## (undated) DEVICE — HANDPIECE 10FT INTPLS SCT PLS IRRIGATION HAND CONTROL SET (6/PK)

## (undated) DEVICE — SODIUM CHL IRRIGATION 0.9% 1000ML (12EA/CA)

## (undated) DEVICE — SYRINGE DISP. 60 CC LL - (30/BX, 12BX/CA)**WHEN THESE ARE GONE ORDER #500206**

## (undated) DEVICE — TRAY SKIN SCRUB PVP WET (20EA/CA) PART #DYND70356 DISCONTINUED

## (undated) DEVICE — GLOVE, LITE (PAIR)

## (undated) DEVICE — KIT ROOM DECONTAMINATION

## (undated) DEVICE — Device

## (undated) DEVICE — NEEDLE SPINAL NON-SAFETY 18 GA X 3 IN (25EA/BX)

## (undated) DEVICE — BAG, SPONGE COUNT 50600

## (undated) DEVICE — SUCTION INSTRUMENT YANKAUER BULBOUS TIP W/O VENT (50EA/CA)

## (undated) DEVICE — TIP INTPLS HFLO ML ORFC BTRY - (12/CS)  FOR SURGILAV

## (undated) DEVICE — SENSOR SPO2 NEO LNCS ADHESIVE (20/BX) SEE USER NOTES

## (undated) DEVICE — MASK ANESTHESIA ADULT  - (100/CA)

## (undated) DEVICE — SET SUCT.W/SLEEVE VIA-GUARD - (10/BX10BX/CA)

## (undated) DEVICE — SPONGE GAUZESTER 4 X 4 4PLY - (128PK/CA)

## (undated) DEVICE — TUBE E-T HI-LO CUFF 8.0MM (10EA/PK)

## (undated) DEVICE — GLOVE BIOGEL SZ 7.5 SURGICAL PF LTX - (50PR/BX 4BX/CA)

## (undated) DEVICE — GLOVE BIOGEL SZ 7 SURGICAL PF LTX - (50PR/BX 4BX/CA)

## (undated) DEVICE — SUTURE 1 ETHIBOND CTX C/R 8-1 - (12/BX)

## (undated) DEVICE — HUMID-VENT HEAT AND MOISTURE EXCHANGE- (50/BX)

## (undated) DEVICE — GLOVE BIOGEL PI ORTHO SZ 7 PF LF (40PR/BX)

## (undated) DEVICE — WATER IRRIGATION STERILE 1000ML (12EA/CA)

## (undated) DEVICE — CANISTER SUCTION RIGID RED 1500CC (40EA/CA)

## (undated) DEVICE — LACTATED RINGERS INJ 1000 ML - (14EA/CA 60CA/PF)

## (undated) DEVICE — BLADE RECIP 77.5 X 11.2 X .76MM (1/EA)

## (undated) DEVICE — SODIUM CHL. IRRIGATION 0.9% 3000ML (4EA/CA 65CA/PF)

## (undated) DEVICE — SUTURE 2-0 MONOCRYL PLUS UNDYED CT-1 1 X 36 (36EA/BX)"